# Patient Record
Sex: MALE | Race: OTHER | NOT HISPANIC OR LATINO | URBAN - METROPOLITAN AREA
[De-identification: names, ages, dates, MRNs, and addresses within clinical notes are randomized per-mention and may not be internally consistent; named-entity substitution may affect disease eponyms.]

---

## 2017-08-17 ENCOUNTER — EMERGENCY (EMERGENCY)
Facility: HOSPITAL | Age: 32
LOS: 0 days | Discharge: HOME | End: 2017-08-17

## 2017-08-17 DIAGNOSIS — R10.13 EPIGASTRIC PAIN: ICD-10-CM

## 2017-08-17 DIAGNOSIS — K80.20 CALCULUS OF GALLBLADDER WITHOUT CHOLECYSTITIS WITHOUT OBSTRUCTION: ICD-10-CM

## 2017-08-17 DIAGNOSIS — K29.70 GASTRITIS, UNSPECIFIED, WITHOUT BLEEDING: ICD-10-CM

## 2018-10-20 ENCOUNTER — INPATIENT (INPATIENT)
Facility: HOSPITAL | Age: 33
LOS: 8 days | Discharge: HOME | End: 2018-10-29
Attending: THORACIC SURGERY (CARDIOTHORACIC VASCULAR SURGERY) | Admitting: THORACIC SURGERY (CARDIOTHORACIC VASCULAR SURGERY)

## 2018-10-20 VITALS — WEIGHT: 149.91 LBS

## 2018-10-20 DIAGNOSIS — Z90.49 ACQUIRED ABSENCE OF OTHER SPECIFIED PARTS OF DIGESTIVE TRACT: Chronic | ICD-10-CM

## 2018-10-20 LAB
ALBUMIN SERPL ELPH-MCNC: 3.9 G/DL — SIGNIFICANT CHANGE UP (ref 3.5–5.2)
ALP SERPL-CCNC: 250 U/L — HIGH (ref 30–115)
ALT FLD-CCNC: 169 U/L — HIGH (ref 0–41)
ANION GAP SERPL CALC-SCNC: 14 MMOL/L — SIGNIFICANT CHANGE UP (ref 7–14)
AST SERPL-CCNC: 73 U/L — HIGH (ref 0–41)
BASE EXCESS BLDV CALC-SCNC: 4.6 MMOL/L — HIGH (ref -2–2)
BASOPHILS # BLD AUTO: 0.02 K/UL — SIGNIFICANT CHANGE UP (ref 0–0.2)
BASOPHILS NFR BLD AUTO: 0.2 % — SIGNIFICANT CHANGE UP (ref 0–1)
BILIRUB SERPL-MCNC: 0.6 MG/DL — SIGNIFICANT CHANGE UP (ref 0.2–1.2)
BUN SERPL-MCNC: 12 MG/DL — SIGNIFICANT CHANGE UP (ref 10–20)
CA-I SERPL-SCNC: 1.13 MMOL/L — SIGNIFICANT CHANGE UP (ref 1.12–1.3)
CALCIUM SERPL-MCNC: 9.3 MG/DL — SIGNIFICANT CHANGE UP (ref 8.5–10.1)
CHLORIDE SERPL-SCNC: 98 MMOL/L — SIGNIFICANT CHANGE UP (ref 98–110)
CO2 SERPL-SCNC: 29 MMOL/L — SIGNIFICANT CHANGE UP (ref 17–32)
CREAT SERPL-MCNC: 1 MG/DL — SIGNIFICANT CHANGE UP (ref 0.7–1.5)
D DIMER BLD IA.RAPID-MCNC: 884 NG/ML DDU — HIGH (ref 0–230)
EOSINOPHIL # BLD AUTO: 0.19 K/UL — SIGNIFICANT CHANGE UP (ref 0–0.7)
EOSINOPHIL NFR BLD AUTO: 2.1 % — SIGNIFICANT CHANGE UP (ref 0–8)
GAS PNL BLDV: 141 MMOL/L — SIGNIFICANT CHANGE UP (ref 136–145)
GAS PNL BLDV: SIGNIFICANT CHANGE UP
GLUCOSE SERPL-MCNC: 118 MG/DL — HIGH (ref 70–99)
HCO3 BLDV-SCNC: 30 MMOL/L — HIGH (ref 22–29)
HCT VFR BLD CALC: 38.8 % — LOW (ref 42–52)
HCT VFR BLDA CALC: 36 % — SIGNIFICANT CHANGE UP (ref 34–44)
HGB BLD CALC-MCNC: 11.8 G/DL — LOW (ref 14–18)
HGB BLD-MCNC: 12.7 G/DL — LOW (ref 14–18)
HOROWITZ INDEX BLDV+IHG-RTO: 21 — SIGNIFICANT CHANGE UP
IMM GRANULOCYTES NFR BLD AUTO: 0.7 % — HIGH (ref 0.1–0.3)
LACTATE BLDV-MCNC: 1 MMOL/L — SIGNIFICANT CHANGE UP (ref 0.5–1.6)
LYMPHOCYTES # BLD AUTO: 1.65 K/UL — SIGNIFICANT CHANGE UP (ref 1.2–3.4)
LYMPHOCYTES # BLD AUTO: 18.6 % — LOW (ref 20.5–51.1)
MCHC RBC-ENTMCNC: 26.7 PG — LOW (ref 27–31)
MCHC RBC-ENTMCNC: 32.7 G/DL — SIGNIFICANT CHANGE UP (ref 32–37)
MCV RBC AUTO: 81.5 FL — SIGNIFICANT CHANGE UP (ref 80–94)
MONOCYTES # BLD AUTO: 1.1 K/UL — HIGH (ref 0.1–0.6)
MONOCYTES NFR BLD AUTO: 12.4 % — HIGH (ref 1.7–9.3)
NEUTROPHILS # BLD AUTO: 5.86 K/UL — SIGNIFICANT CHANGE UP (ref 1.4–6.5)
NEUTROPHILS NFR BLD AUTO: 66 % — SIGNIFICANT CHANGE UP (ref 42.2–75.2)
NRBC # BLD: 0 /100 WBCS — SIGNIFICANT CHANGE UP (ref 0–0)
PCO2 BLDV: 45 MMHG — SIGNIFICANT CHANGE UP (ref 41–51)
PH BLDV: 7.43 — SIGNIFICANT CHANGE UP (ref 7.26–7.43)
PLATELET # BLD AUTO: 278 K/UL — SIGNIFICANT CHANGE UP (ref 130–400)
PO2 BLDV: 15 MMHG — LOW (ref 20–40)
POTASSIUM BLDV-SCNC: 3.6 MMOL/L — SIGNIFICANT CHANGE UP (ref 3.3–5.6)
POTASSIUM SERPL-MCNC: 4.1 MMOL/L — SIGNIFICANT CHANGE UP (ref 3.5–5)
POTASSIUM SERPL-SCNC: 4.1 MMOL/L — SIGNIFICANT CHANGE UP (ref 3.5–5)
PROT SERPL-MCNC: 7.5 G/DL — SIGNIFICANT CHANGE UP (ref 6–8)
RBC # BLD: 4.76 M/UL — SIGNIFICANT CHANGE UP (ref 4.7–6.1)
RBC # FLD: 12.6 % — SIGNIFICANT CHANGE UP (ref 11.5–14.5)
SAO2 % BLDV: 21 % — SIGNIFICANT CHANGE UP
SODIUM SERPL-SCNC: 141 MMOL/L — SIGNIFICANT CHANGE UP (ref 135–146)
WBC # BLD: 8.88 K/UL — SIGNIFICANT CHANGE UP (ref 4.8–10.8)
WBC # FLD AUTO: 8.88 K/UL — SIGNIFICANT CHANGE UP (ref 4.8–10.8)

## 2018-10-20 RX ORDER — SODIUM CHLORIDE 9 MG/ML
2000 INJECTION INTRAMUSCULAR; INTRAVENOUS; SUBCUTANEOUS ONCE
Qty: 0 | Refills: 0 | Status: COMPLETED | OUTPATIENT
Start: 2018-10-20 | End: 2018-10-20

## 2018-10-20 RX ORDER — ACETAMINOPHEN 500 MG
975 TABLET ORAL ONCE
Qty: 0 | Refills: 0 | Status: COMPLETED | OUTPATIENT
Start: 2018-10-20 | End: 2018-10-20

## 2018-10-20 RX ORDER — HEPARIN SODIUM 5000 [USP'U]/ML
5000 INJECTION INTRAVENOUS; SUBCUTANEOUS EVERY 8 HOURS
Qty: 0 | Refills: 0 | Status: DISCONTINUED | OUTPATIENT
Start: 2018-10-20 | End: 2018-10-29

## 2018-10-20 RX ORDER — AZITHROMYCIN 500 MG/1
500 TABLET, FILM COATED ORAL ONCE
Qty: 0 | Refills: 0 | Status: COMPLETED | OUTPATIENT
Start: 2018-10-20 | End: 2018-10-20

## 2018-10-20 RX ORDER — AZITHROMYCIN 500 MG/1
500 TABLET, FILM COATED ORAL EVERY 24 HOURS
Qty: 0 | Refills: 0 | Status: DISCONTINUED | OUTPATIENT
Start: 2018-10-20 | End: 2018-10-21

## 2018-10-20 RX ORDER — SODIUM CHLORIDE 9 MG/ML
1000 INJECTION INTRAMUSCULAR; INTRAVENOUS; SUBCUTANEOUS ONCE
Qty: 0 | Refills: 0 | Status: DISCONTINUED | OUTPATIENT
Start: 2018-10-20 | End: 2018-10-25

## 2018-10-20 RX ORDER — CEFTRIAXONE 500 MG/1
2 INJECTION, POWDER, FOR SOLUTION INTRAMUSCULAR; INTRAVENOUS EVERY 24 HOURS
Qty: 0 | Refills: 0 | Status: DISCONTINUED | OUTPATIENT
Start: 2018-10-20 | End: 2018-10-23

## 2018-10-20 RX ORDER — CEFTRIAXONE 500 MG/1
1 INJECTION, POWDER, FOR SOLUTION INTRAMUSCULAR; INTRAVENOUS ONCE
Qty: 0 | Refills: 0 | Status: COMPLETED | OUTPATIENT
Start: 2018-10-20 | End: 2018-10-20

## 2018-10-20 RX ORDER — ACETAMINOPHEN 500 MG
650 TABLET ORAL EVERY 6 HOURS
Qty: 0 | Refills: 0 | Status: DISCONTINUED | OUTPATIENT
Start: 2018-10-20 | End: 2018-10-29

## 2018-10-20 RX ORDER — IBUPROFEN 200 MG
600 TABLET ORAL EVERY 6 HOURS
Qty: 0 | Refills: 0 | Status: DISCONTINUED | OUTPATIENT
Start: 2018-10-20 | End: 2018-10-29

## 2018-10-20 RX ADMIN — Medication 200 MILLIGRAM(S): at 22:18

## 2018-10-20 RX ADMIN — Medication 650 MILLIGRAM(S): at 21:50

## 2018-10-20 RX ADMIN — Medication 650 MILLIGRAM(S): at 23:13

## 2018-10-20 RX ADMIN — SODIUM CHLORIDE 2000 MILLILITER(S): 9 INJECTION INTRAMUSCULAR; INTRAVENOUS; SUBCUTANEOUS at 12:30

## 2018-10-20 RX ADMIN — Medication 975 MILLIGRAM(S): at 13:16

## 2018-10-20 RX ADMIN — CEFTRIAXONE 100 GRAM(S): 500 INJECTION, POWDER, FOR SOLUTION INTRAMUSCULAR; INTRAVENOUS at 12:57

## 2018-10-20 RX ADMIN — AZITHROMYCIN 255 MILLIGRAM(S): 500 TABLET, FILM COATED ORAL at 13:47

## 2018-10-20 RX ADMIN — Medication 975 MILLIGRAM(S): at 12:56

## 2018-10-20 NOTE — H&P ADULT - DOES THIS PATIENT HAVE A HISTORY OF OR HAS BEEN DX WITH HEART FAILURE?
----- Message from Issa Valencia sent at 2/14/2017  8:04 AM CST -----  Contact: Gamaliel Lugo  Requesting dates for meningitis, measles, and tetanus. Please call [phone]. Thanks!   no

## 2018-10-20 NOTE — H&P ADULT - NSHPREVIEWOFSYSTEMS_GEN_ALL_CORE
CONSTITUTIONAL: No weakness, +subjective fever, no chills  EYES/ENT: No visual changes;  No vertigo or throat pain   NECK: No pain or stiffness  RESPIRATORY: dry cough, no wheezing or hemoptysis;   CARDIOVASCULAR: no palpitations  GASTROINTESTINAL: No abdominal or epigastric pain. No nausea, vomiting, or hematemesis; No diarrhea or constipation. No melena or hematochezia.  GENITOURINARY: No dysuria, frequency or hematuria  NEUROLOGICAL: No numbness or weakness  SKIN: No itching, rashes

## 2018-10-20 NOTE — H&P ADULT - ASSESSMENT
34 yo M with progressively worsening SOB, chest pain, cough and subjective fever.    # Lung mass with pleural effusion. ?Infectious vs. malignancy  - low risk for malignancy - non smoker, no FHx, no aerosol lung irritants at work  - will treat for PNA given fever, tachycardia with Ceftriaxone and Azithromycin   - IVF  - resp Cx, BCx x2  - Pulmonary eval    #Transaminitis   - will repeat, if still elevated will consider US liver

## 2018-10-20 NOTE — ED PROVIDER NOTE - MEDICAL DECISION MAKING DETAILS
Pt with fever, cough, pleuritic right CP as above, one xam vital signs appreciated, mild tachypnea on minimal exertion but comfortable at rest, conj pink neck supple cor reg lungs with absent BS right base abd snt calves nontender, labs and studies reviewed and d/w patient, will admit for IV abx, pulm eval, stable for floor

## 2018-10-20 NOTE — ED PROVIDER NOTE - PROGRESS NOTE DETAILS
Spoke to radiologist that states will make additional impression to CTA: Radi logist states right lower lobe with lung mass and will advised f/u in 4 months for CT/PET scan. Will advise pt on additional impressions made by radiology. Updated patient on need to admit to hospital for course of IV abx. Pt is accepting of plan. Also discussed CTA results with pt, discussed finding of 5cm mass that will need to be f/u with lung specialist. Pt made aware of all results.Pt has copies of all results. Spoke with Dr. Cardenas and accepts admission. I was directly involved in the care of this patient. PA Fellow Rj note/plan reviewed and agreed.

## 2018-10-20 NOTE — ED PROVIDER NOTE - OBJECTIVE STATEMENT
32 yo male, no significant PMH, presents to the ED for evaluation of right sided chest pain. Patient reports he has had 10 days of right sided chest pain that is radiating to right mid back and non-productive cough; admits to subjective fever since yesterday. Pt reports he has not taken any OTC medication for pain or subjective fever. Patient also reports recent travel from [place] one month ago and states has been SOB with exertion over the last few days. Denies HA, visual changes, ear pain, sore throat, left sided chest pain, N/V/D, numbness, tingling, dizziness, recent illness, or trauma.

## 2018-10-20 NOTE — H&P ADULT - HISTORY OF PRESENT ILLNESS
32 yo M with no significant MHx presented with c/o SOB on exertion, chest pain started 2 weeks ago and gotten progressively worse, 1 week ago cough started and last 4 nights pt had subjective fever. He traveled to [place] a month ago, works as a . Denies sick contacts. He describes pain as sharp, worse with deep breath, do not radiate anywhere, alleviates with Advil.

## 2018-10-20 NOTE — H&P ADULT - NSHPPHYSICALEXAM_GEN_ALL_CORE
NAD, not toxic looking  HEENT: PERRL  NECK: supple, no JVD  RESP: Right side decreased breath sounds, no crackles or wheezes, left side cta  CVS: S1S2, RRR, tachy  GI: abdomen soft NT, ND  Extremities: no c/c/edema, no calf tenderness  NEURO: AOx3, no focal deficit  H/L: no enlarged LN noted

## 2018-10-20 NOTE — ED ADULT NURSE NOTE - NSIMPLEMENTINTERV_GEN_ALL_ED
Implemented All Universal Safety Interventions:  [place] to call system. Call bell, personal items and telephone within reach. Instruct patient to call for assistance. Room bathroom lighting operational. Non-slip footwear when patient is off stretcher. Physically safe environment: no spills, clutter or unnecessary equipment. Stretcher in lowest position, wheels locked, appropriate side rails in place.

## 2018-10-20 NOTE — ED PROVIDER NOTE - NS ED ROS FT
Constitutional: (+) subjective fever, (-) chills, (-) night sweats, (-) recent illness, (+) recent travel,   Eyes: (-) blurry vision, (-) pain,   ENT: (-) ear pain, (-) discharge, (-) nasal congestions, (-) sore throat   Cardiovascular: (+) right sided chest pain, (-) syncope  Respiratory: (+) cough, (+) shortness of breath with exertion, (-) dyspnea,   Gastrointestinal: (-) vomiting, (-) diarrhea, (-)nausea,   Musculoskeletal: (-) neck pain, (-) back pain, (-) joint pain,   Integumentary: (-) rash, (-) edema  Neurological: (-) headache, (-) altered mental status, (-) dizziness, (-) tingling, (-)numbness, (-) tremors  Psychiatric: (-) hallucinations, (-)nervousness, (-)depression, (-)SI/HI  Peripheral Vascular: (-) pain while walking, (-) leg swelling, (-) color changes  : (-)frequency, (-)polyuria, (-)dysuria, (-)nocturia, (-) hematuria  Allergic/Immunologic: (-) pruritus Constitutional: (+) subjective fever, (-) chills, (-) night sweats, (-) recent illness, (+) recent travel,   Eyes: (-) blurry vision, (-) pain,   ENT: (-) ear pain, (-) discharge, (-) nasal congestions, (-) sore throat   Cardiovascular: (+) right sided chest pain, (-) syncope  Respiratory: (+) cough, (+) shortness of breath with exertion, (-) dyspnea,   Gastrointestinal: (-) vomiting, (-) diarrhea, (-)nausea,   Musculoskeletal: (-) neck pain, (-) back pain, (-) joint pain,   Integumentary: (-) rash, (-) edema  Neurological: (-) headache, (-) dizziness, (-) tingling, (-)numbness  :(-)dysuria,

## 2018-10-20 NOTE — ED PROVIDER NOTE - PHYSICAL EXAMINATION
Physical Exam    Vital Signs: I have reviewed the initial vital signs.  Constitutional: well-nourished, appears stated age, no acute distress  Eyes: Conjunctiva pink, Sclera clear, PERRLA, EOMI.  Cardiovascular: S1 and S2, tachycardic, regular rhythm, well-perfused extremities, radial pulses equal and 2+, pedal pulses are 2+ and equal. Cap refill is less than 2 seconds.  Respiratory: unlabored respiratory effort, right lung base with decreased air entry and scattered rhonchi, left side of lung is clear to ascultation with good air entry without.  Gastrointestinal: soft, non-tender abdomen,  Musculoskeletal: supple neck, no lower extremity edema, no midline tenderness  Integumentary: warm, dry, no rash  Neurologic: awake, alert, cranial nerves II-XII grossly intact, extremities’ motor and sensory functions grossly intact

## 2018-10-20 NOTE — H&P ADULT - NSHPLABSRESULTS_GEN_ALL_CORE
12.7   8.88  )-----------( 278      ( 20 Oct 2018 12:22 )             38.8   10-20    141  |  98  |  12  ----------------------------<  118<H>  4.1   |  29  |  1.0    Ca    9.3      20 Oct 2018 12:22    TPro  7.5  /  Alb  3.9  /  TBili  0.6  /  DBili  x   /  AST  73<H>  /  ALT  169<H>  /  AlkPhos  250<H>  10-20    Blood Gas Venous - Lactate: 1.0 mmoL/L (10.20.18 @ 14:24)    D-Dimer Assay, Quantitative: 884 ng/mL DDU (10.20.18 @ 12:22)    CTA chest: 1. Small right pleural effusion with right lower lobe atelectasis and   suggestion of posterior right lower lobe 5 cm mass. Further evaluation   with PET or short interval 4 week CT chest follow-up recommended, as   malignancy is a high consideration.    2. No evidence of acute pulmonary emboli.

## 2018-10-21 LAB
ANION GAP SERPL CALC-SCNC: 17 MMOL/L — HIGH (ref 7–14)
BUN SERPL-MCNC: 9 MG/DL — LOW (ref 10–20)
CALCIUM SERPL-MCNC: 8.6 MG/DL — SIGNIFICANT CHANGE UP (ref 8.5–10.1)
CHLORIDE SERPL-SCNC: 97 MMOL/L — LOW (ref 98–110)
CO2 SERPL-SCNC: 26 MMOL/L — SIGNIFICANT CHANGE UP (ref 17–32)
CREAT SERPL-MCNC: 0.8 MG/DL — SIGNIFICANT CHANGE UP (ref 0.7–1.5)
GLUCOSE SERPL-MCNC: 113 MG/DL — HIGH (ref 70–99)
HCT VFR BLD CALC: 37.2 % — LOW (ref 42–52)
HGB BLD-MCNC: 12.3 G/DL — LOW (ref 14–18)
MCHC RBC-ENTMCNC: 26.9 PG — LOW (ref 27–31)
MCHC RBC-ENTMCNC: 33.1 G/DL — SIGNIFICANT CHANGE UP (ref 32–37)
MCV RBC AUTO: 81.2 FL — SIGNIFICANT CHANGE UP (ref 80–94)
NRBC # BLD: 0 /100 WBCS — SIGNIFICANT CHANGE UP (ref 0–0)
PLATELET # BLD AUTO: 294 K/UL — SIGNIFICANT CHANGE UP (ref 130–400)
POTASSIUM SERPL-MCNC: 4.1 MMOL/L — SIGNIFICANT CHANGE UP (ref 3.5–5)
POTASSIUM SERPL-SCNC: 4.1 MMOL/L — SIGNIFICANT CHANGE UP (ref 3.5–5)
RBC # BLD: 4.58 M/UL — LOW (ref 4.7–6.1)
RBC # FLD: 12.7 % — SIGNIFICANT CHANGE UP (ref 11.5–14.5)
SODIUM SERPL-SCNC: 140 MMOL/L — SIGNIFICANT CHANGE UP (ref 135–146)
WBC # BLD: 10.8 K/UL — SIGNIFICANT CHANGE UP (ref 4.8–10.8)
WBC # FLD AUTO: 10.8 K/UL — SIGNIFICANT CHANGE UP (ref 4.8–10.8)

## 2018-10-21 RX ORDER — ALBUTEROL 90 UG/1
1.25 AEROSOL, METERED ORAL EVERY 4 HOURS
Qty: 0 | Refills: 0 | Status: DISCONTINUED | OUTPATIENT
Start: 2018-10-21 | End: 2018-10-29

## 2018-10-21 RX ORDER — SODIUM CHLORIDE 9 MG/ML
1000 INJECTION INTRAMUSCULAR; INTRAVENOUS; SUBCUTANEOUS ONCE
Qty: 0 | Refills: 0 | Status: DISCONTINUED | OUTPATIENT
Start: 2018-10-21 | End: 2018-10-25

## 2018-10-21 RX ORDER — SODIUM CHLORIDE 9 MG/ML
1000 INJECTION INTRAMUSCULAR; INTRAVENOUS; SUBCUTANEOUS
Qty: 0 | Refills: 0 | Status: DISCONTINUED | OUTPATIENT
Start: 2018-10-21 | End: 2018-10-25

## 2018-10-21 RX ADMIN — Medication 100 MILLIGRAM(S): at 21:35

## 2018-10-21 RX ADMIN — Medication 600 MILLIGRAM(S): at 22:56

## 2018-10-21 RX ADMIN — Medication 600 MILLIGRAM(S): at 18:12

## 2018-10-21 RX ADMIN — Medication 200 MILLIGRAM(S): at 09:18

## 2018-10-21 RX ADMIN — Medication 600 MILLIGRAM(S): at 23:18

## 2018-10-21 RX ADMIN — Medication 200 MILLIGRAM(S): at 18:07

## 2018-10-21 RX ADMIN — CEFTRIAXONE 100 GRAM(S): 500 INJECTION, POWDER, FOR SOLUTION INTRAMUSCULAR; INTRAVENOUS at 10:05

## 2018-10-21 RX ADMIN — Medication 650 MILLIGRAM(S): at 05:40

## 2018-10-21 RX ADMIN — Medication 600 MILLIGRAM(S): at 09:18

## 2018-10-21 RX ADMIN — Medication 100 MILLIGRAM(S): at 18:07

## 2018-10-21 RX ADMIN — Medication 650 MILLIGRAM(S): at 05:10

## 2018-10-21 RX ADMIN — ALBUTEROL 1.25 MILLIGRAM(S): 90 AEROSOL, METERED ORAL at 19:55

## 2018-10-21 NOTE — PROGRESS NOTE ADULT - SUBJECTIVE AND OBJECTIVE BOX
KOURTNEY VEE    Patient is a 33y old  Male who presents with a chief complaint of SOB on exertion, chest pain and cough. (21 Oct 2018 10:00)    HPI:  32 yo M with no significant MHx presented with c/o SOB on exertion, chest pain started 2 weeks ago and gotten progressively worse, 1 week ago cough started and last 4 nights pt had subjective fever. He traveled to [place] a month ago, works as a . Denies sick contacts. He describes pain as sharp, worse with deep breath, do not radiate anywhere, alleviates with Advil. (20 Oct 2018 17:58)    INTERVAL HPI/OVERNIGHT EVENTS: no events. intermittent fever    ROS: All ROS negative except cough, weakness and chest pain    PHYSICAL EXAM:  T(C): 38, Max: 38.8 (10-20-18 @ 22:08)  HR: 112 (101 - 132)  BP: 130/83 (126/87 - 146/75)  RR: 16 (16 - 18)  SpO2: 97% (96% - 98%)    NAD, not toxic looking HEENT: PERRL NECK: supple, no JVD RESP: Right side decreased breath sounds, no crackles or wheezes, left side cta CVS: S1S2, RRR, tachy GI: abdomen soft NT, ND Extremities: no c/c/edema, no calf tenderness NEURO: AOx3, no focal deficit H/L: no enlarged LN noted	      Care Discussed with Consultants/Other Providers     LABS:                        12.3   10.80 )-----------( 294      ( 21 Oct 2018 07:11 )             37.2     10-21    140  |  97<L>  |  9<L>  ----------------------------<  113<H>  4.1   |  26  |  0.8    Ca    8.6      21 Oct 2018 07:11    TPro  7.5  /  Alb  3.9  /  TBili  0.6  /  DBili  x   /  AST  73<H>  /  ALT  169<H>  /  AlkPhos  250<H>  10-20    MEDICATIONS  (STANDING):  benzonatate 100 milliGRAM(s) Oral every 8 hours  cefTRIAXone   IVPB 2 Gram(s) IV Intermittent every 24 hours  heparin  Injectable 5000 Unit(s) SubCutaneous every 8 hours  levoFLOXacin IVPB      sodium chloride 0.9% Bolus 1000 milliLiter(s) IV Bolus once  sodium chloride 0.9% Bolus 1000 milliLiter(s) IV Bolus once  sodium chloride 0.9%. 1000 milliLiter(s) (125 mL/Hr) IV Continuous <Continuous>    MEDICATIONS  (PRN):  acetaminophen   Tablet .. 650 milliGRAM(s) Oral every 6 hours PRN Temp greater or equal to 38C (100.4F)  ALBUTerol   0.042% 1.25 milliGRAM(s) Nebulizer every 4 hours PRN Shortness of Breath  guaiFENesin    Syrup 200 milliGRAM(s) Oral every 6 hours PRN Cough  ibuprofen  Tablet. 600 milliGRAM(s) Oral every 6 hours PRN Mild Pain (1 - 3)

## 2018-10-21 NOTE — PROGRESS NOTE ADULT - ASSESSMENT
34 yo M with progressively worsening SOB, chest pain, cough and subjective fever.    # Sepsis presented on admission due to CAP.   - discussed with Pulmonary - less likley malignancy, most likely PNA with parapneumonic effusion  - continue Ceftriaxone 2 gr Q24 IV, will switch to Levaquin 750 IV  - continue IVF  - resp Cx, BCx x2 pending  - ID eval  - Legionella, RVP, Mycoplasma AB pending  - possible diagnostic tap as per pulmonary    #Transaminitis   - will repeat, if still elevated will consider US liver    DVT ppx  Full code

## 2018-10-21 NOTE — PROGRESS NOTE ADULT - SUBJECTIVE AND OBJECTIVE BOX
Patient is a 33y old  Male who presents with a chief complaint of SOB on exertion, chest pain and cough. (20 Oct 2018 17:58)      HPI:  32 yo M with no significant MHx presented with c/o SOB on exertion, chest pain started 2 weeks ago and gotten progressively worse, 1 week ago cough started and last 4 nights pt had subjective fever. He traveled to [place] a month ago, works as a . Denies sick contacts. He describes pain as sharp, worse with deep breath, do not radiate anywhere, alleviates with Advil. (20 Oct 2018 17:58)  ct scan done in ER ?? 5cm mass i reviewed the images not able to see the mass it look as pneumonia with parapneumonic effusion which fit with the history       PAST MEDICAL & SURGICAL HISTORY:  No pertinent past medical history  S/P cholecystectomy      FAMILY HISTORY:  No pertinent family history in first degree relatives    Family history: No family cardiovascular system   Occupation:  Alochol: Denied  Smoking: [place]   Drug Use: Denied  Marital Status:           Allergies    No Known Allergies    Intolerances        Home Medications:      ROS: as in HPI; All other systems reviewed are negative        PHYSICAL EXAM:  Vital Signs Last 24 Hrs  T(C): 38 (21 Oct 2018 09:19), Max: 38.8 (20 Oct 2018 22:08)  T(F): 100.4 (21 Oct 2018 09:19), Max: 101.9 (20 Oct 2018 22:08)  HR: 112 (21 Oct 2018 09:19) (101 - 132)  BP: 130/83 (21 Oct 2018 05:09) (126/85 - 146/75)  BP(mean): --  RR: 16 (21 Oct 2018 05:09) (16 - 22)  SpO2: 97% (20 Oct 2018 16:16) (96% - 98%)      GENERAL: NAD, well-groomed, well-developed  HEAD:  Atraumatic, Normocephalic  EYES: EOMI, PERRLA, conjunctiva and sclera clear  ENMT: No tonsillar erythema, exudates, or enlargement; Moist mucous membranes, Good dentition, No lesions  NECK: Supple, No JVD, Normal thyroid  NERVOUS SYSTEM:  Alert & Oriented X3, Good concentration; Motor Strength 5/5 B/L upper and lower extremities; DTRs 2+ intact and symmetric  CHEST/LUNG: Clear to percussion bilaterally; No rales, rhonchi, wheezing, or rubs  HEART: Regular rate and rhythm; No murmurs, rubs, or gallops  ABDOMEN: Soft, Nontender, Nondistended; Bowel sounds present  EXTREMITIES:  2+ Peripheral Pulses, No clubbing, cyanosis, or edema  LYMPH: No lymphadenopathy noted  SKIN: No rashes or lesions    HOSPITAL MEDICATIONS:  MEDICATIONS  (STANDING):  azithromycin  IVPB 500 milliGRAM(s) IV Intermittent every 24 hours  benzonatate 100 milliGRAM(s) Oral every 8 hours  cefTRIAXone   IVPB 2 Gram(s) IV Intermittent every 24 hours  heparin  Injectable 5000 Unit(s) SubCutaneous every 8 hours  sodium chloride 0.9% Bolus 1000 milliLiter(s) IV Bolus once    MEDICATIONS  (PRN):  acetaminophen   Tablet .. 650 milliGRAM(s) Oral every 6 hours PRN Temp greater or equal to 38C (100.4F)  ALBUTerol   0.042% 1.25 milliGRAM(s) Nebulizer every 4 hours PRN Shortness of Breath  guaiFENesin    Syrup 200 milliGRAM(s) Oral every 6 hours PRN Cough  ibuprofen  Tablet. 600 milliGRAM(s) Oral every 6 hours PRN Mild Pain (1 - 3)      LABS:                        12.3   10.80 )-----------( 294      ( 21 Oct 2018 07:11 )             37.2     10-21    140  |  97<L>  |  9<L>  ----------------------------<  113<H>  4.1   |  26  |  0.8    Ca    8.6      21 Oct 2018 07:11    TPro  7.5  /  Alb  3.9  /  TBili  0.6  /  DBili  x   /  AST  73<H>  /  ALT  169<H>  /  AlkPhos  250<H>  10-20          Venous Blood Gas:  10-20 @ 14:24  7.43/45/15/30/21  VBG Lactate: 1.0          RADIOLOGY:  [ ] Reviewed and interpreted by me  < from: CT Angio Chest w/ IV Cont (10.20.18 @ 15:26) >  1. Small right pleural effusion with right lower lobe atelectasis and   suggestion of posterior right lower lobe 5 cm mass. Further evaluation   with PET or short interval 4 week CT chest follow-up recommended, as   malignancy is a high consideration.    2. No evidence of acute pulmonary emboli.    Spoke with Charlie ALEMAN on 10/20/2018 3:45 PM with readback.    < end of copied text >    ECHO:    Point of Care Ultrasound Findings;    PFT:

## 2018-10-21 NOTE — PROGRESS NOTE ADULT - ASSESSMENT
Impression:  most likely PNA with parapneumonic effusion   doubt malignancy         Plan:  pan cx   virla panel now   ID consult   on Rocephine IV 2 gram   d/c azithromycin   Iv Levaquin 750 mg start now   follow cx Bld cx   cxr abhinav   will revaluate abhinav will check with US if can do thoracentesis diagnostic ?? small effusion   case discussed with patient and Hospitalist

## 2018-10-22 LAB
ALBUMIN SERPL ELPH-MCNC: 3.3 G/DL — LOW (ref 3.5–5.2)
ALP SERPL-CCNC: 225 U/L — HIGH (ref 30–115)
ALT FLD-CCNC: 126 U/L — HIGH (ref 0–41)
ANION GAP SERPL CALC-SCNC: 18 MMOL/L — HIGH (ref 7–14)
AST SERPL-CCNC: 39 U/L — SIGNIFICANT CHANGE UP (ref 0–41)
BILIRUB SERPL-MCNC: 0.3 MG/DL — SIGNIFICANT CHANGE UP (ref 0.2–1.2)
BUN SERPL-MCNC: 10 MG/DL — SIGNIFICANT CHANGE UP (ref 10–20)
CALCIUM SERPL-MCNC: 8.6 MG/DL — SIGNIFICANT CHANGE UP (ref 8.5–10.1)
CHLORIDE SERPL-SCNC: 99 MMOL/L — SIGNIFICANT CHANGE UP (ref 98–110)
CO2 SERPL-SCNC: 24 MMOL/L — SIGNIFICANT CHANGE UP (ref 17–32)
CREAT SERPL-MCNC: 0.8 MG/DL — SIGNIFICANT CHANGE UP (ref 0.7–1.5)
GLUCOSE SERPL-MCNC: 98 MG/DL — SIGNIFICANT CHANGE UP (ref 70–99)
HCT VFR BLD CALC: 35.1 % — LOW (ref 42–52)
HGB BLD-MCNC: 11.6 G/DL — LOW (ref 14–18)
LEGIONELLA AG UR QL: NEGATIVE — SIGNIFICANT CHANGE UP
MCHC RBC-ENTMCNC: 26.7 PG — LOW (ref 27–31)
MCHC RBC-ENTMCNC: 33 G/DL — SIGNIFICANT CHANGE UP (ref 32–37)
MCV RBC AUTO: 80.7 FL — SIGNIFICANT CHANGE UP (ref 80–94)
NRBC # BLD: 0 /100 WBCS — SIGNIFICANT CHANGE UP (ref 0–0)
PLATELET # BLD AUTO: 274 K/UL — SIGNIFICANT CHANGE UP (ref 130–400)
POTASSIUM SERPL-MCNC: 4.3 MMOL/L — SIGNIFICANT CHANGE UP (ref 3.5–5)
POTASSIUM SERPL-SCNC: 4.3 MMOL/L — SIGNIFICANT CHANGE UP (ref 3.5–5)
PROT SERPL-MCNC: 6.5 G/DL — SIGNIFICANT CHANGE UP (ref 6–8)
RAPID RVP RESULT: SIGNIFICANT CHANGE UP
RBC # BLD: 4.35 M/UL — LOW (ref 4.7–6.1)
RBC # FLD: 12.8 % — SIGNIFICANT CHANGE UP (ref 11.5–14.5)
SODIUM SERPL-SCNC: 141 MMOL/L — SIGNIFICANT CHANGE UP (ref 135–146)
WBC # BLD: 9.59 K/UL — SIGNIFICANT CHANGE UP (ref 4.8–10.8)
WBC # FLD AUTO: 9.59 K/UL — SIGNIFICANT CHANGE UP (ref 4.8–10.8)

## 2018-10-22 RX ADMIN — SODIUM CHLORIDE 125 MILLILITER(S): 9 INJECTION INTRAMUSCULAR; INTRAVENOUS; SUBCUTANEOUS at 11:10

## 2018-10-22 RX ADMIN — Medication 600 MILLIGRAM(S): at 14:07

## 2018-10-22 RX ADMIN — Medication 100 MILLIGRAM(S): at 22:22

## 2018-10-22 RX ADMIN — CEFTRIAXONE 100 GRAM(S): 500 INJECTION, POWDER, FOR SOLUTION INTRAMUSCULAR; INTRAVENOUS at 11:08

## 2018-10-22 NOTE — CONSULT NOTE ADULT - ASSESSMENT
IMPRESSION  Pt with pneumonia, pleural effusion and atelectasis    On cefTRIAXone   IVPB 2 Gram(s) IV Intermittent every 24 hours  levoFLOXacin IVPB 750 milliGRAM(s) IV Intermittent every 24     RVP negative, urine legionella antigen negative,       SUGGESTIONs  Await blood cultures, urine culture  Continue Rocephin & Zithromax

## 2018-10-22 NOTE — CONSULT NOTE ADULT - SUBJECTIVE AND OBJECTIVE BOX
KOURTNEY VEE 33yMalePatient is a 33y old  Male who presents with a chief complaint of SOB on exertion, chest pain and cough. (22 Oct 2018 10:13)      Patient has history of:  No Known Allergies        PNEUMONIA  ^CHEST PAIN  No pertinent family history in first degree relatives  Handoff  MEWS Score  No pertinent past medical history  Pneumonia  S/P cholecystectomy  CHEST PAIN  90+        Patient treated with:  cefTRIAXone   IVPB 2 Gram(s) IV Intermittent every 24 hours  levoFLOXacin IVPB 750 milliGRAM(s) IV Intermittent every 24 hours  levoFLOXacin IVPB            PHYSICAL EXAM  T(F): 99.8 (10-22-18 @ 05:00), Max: 99.8 (10-21-18 @ 22:50)  HR: 111 (10-22-18 @ 05:00) (100 - 111)  BP: 131/80 (10-22-18 @ 05:00) (126/78 - 131/80)  RR: 16 (10-22-18 @ 05:00) (16 - 16)  SpO2: --  Daily     Daily   HEENT: normal, no nuchal rigidity  Cor: RSR Nl S1 S2  Lungs: clear  Decreased breath sounds at bases    Abdomen: Nontender, Nl BS,     Ext: No clubbing,cyanosis or edema    LAB & RADIOLOGIC RESULTS:                        12.3   10.80 )-----------( 294      ( 21 Oct 2018 07:11 )             37.2         10-22    x   |  x   |  10  ----------------------------<  98  x    |  24  |  x       TPro  6.5  /  Alb  x   /  TBili  0.3  /  DBili  x   /  AST  39  /  ALT  x   /  AlkPhos  x   10-22    <--<9>>1.0              Culture - Blood (collected 20 Oct 2018 13:15)  Source: .Blood Blood  Preliminary Report (21 Oct 2018 18:01):    No growth to date.    Culture - Blood (collected 20 Oct 2018 13:15)  Source: .Blood Blood  Preliminary Report (21 Oct 2018 18:01):    No growth to date.

## 2018-10-22 NOTE — PROGRESS NOTE ADULT - ASSESSMENT
Impression:  most likely PNA with parapneumonic effusion   doubt malignancy         Plan:  follow ortiz cx   ID follow   on Rocephine IV 2 gram   Iv Levaquin 750 mg  follow cx Bld cx   cxr today   follow LFT   do RUQ sono ?? hepatomegaly   case discussed with patient and Hospitalist Impression:  most likely PNA with parapneumonic effusion   doubt malignancy         Plan: case discussed with radiology and ct reviewed he has multiple loculates effusion and the mass that was mentioned on ct scan can be medial loculate effusion   will continue ABX repeat ct scan in couple of day , if any worsening or fever or increase wbc then will pig tail drainage by IR or ct surgery VATS   now clinically improved and no fever   follow ortiz cx   ID follow   on Rocephine IV 2 gram   Iv Levaquin 750 mg  follow cx Bld cx   cxr today   follow LFT   do RUQ sono ?? hepatomegaly   case discussed with patient and Hospitalist

## 2018-10-22 NOTE — PROGRESS NOTE ADULT - ASSESSMENT
34 yo M with progressively worsening SOB, chest pain, cough and subjective fever.    # Sepsis presented on admission due to CAP.   - discussed with Pulmonary - less likely malignancy, most likely PNA with parapneumonic effusion  - continue Ceftriaxone 2 gr Q24 IV, Levaquin 750 IV  - continue IVF  - resp Cx not collected, BCx x2 prelim NGTD  - Legionella, RVP, Mycoplasma negative  - as per pulm multiple loculated effusions, will repeat CT in 24-48 hrs  - ID on board    #Transaminitis   - still elevated, US liver    DVT ppx  Full code

## 2018-10-22 NOTE — PROGRESS NOTE ADULT - SUBJECTIVE AND OBJECTIVE BOX
Patient is a 33y old  Male who presents with a chief complaint of SOB on exertion, chest pain and cough. (21 Oct 2018 14:36)      INTERVAL HISTORY/overnight events over feel more then 50 % better no fever over night   today i did beside US very samll effusion can not be tapped and consolidation   also enlarge liver         Vital Signs Last 24 Hrs  T(C): 37.7 (22 Oct 2018 05:00), Max: 37.7 (21 Oct 2018 22:50)  T(F): 99.8 (22 Oct 2018 05:00), Max: 99.8 (21 Oct 2018 22:50)  HR: 111 (22 Oct 2018 05:00) (100 - 111)  BP: 131/80 (22 Oct 2018 05:00) (126/78 - 131/80)  BP(mean): --  RR: 16 (22 Oct 2018 05:00) (16 - 16)  SpO2: --  Daily     Daily   I&O's Summary    21 Oct 2018 07:01  -  22 Oct 2018 07:00  --------------------------------------------------------  IN: 800 mL / OUT: 0 mL / NET: 800 mL        Physical Examination:    General: No acute distress.  Alert, oriented, interactive, nonfocal    HEENT: Pupils equal, reactive to light.  Symmetric.    PULM: decrease RLL and crackles     CVS: Regular rate and rhythm, no murmurs, rubs, or gallops    ABD: Soft, nondistended, nontender, normoactive bowel sounds, no masses    EXT: No edema, nontender    SKIN: Warm and well perfused, no rashes noted.    Neurology: no focal deficit     Musculoskeletal : Move all extremity         Lab Results:                        12.3   10.80 )-----------( 294      ( 21 Oct 2018 07:11 )             37.2     22 Oct 2018 08:19    x      |  x      |  10     ----------------------------<  98     x       |  24     |  x        Ca    8.6        22 Oct 2018 08:19    TPro  6.5    /  Alb  x      /  TBili  0.3    /  DBili  x      /  AST  39     /  ALT  x      /  AlkPhos  x      22 Oct 2018 08:19  Amylase x     lipase x                  Microbiology    Culture - Blood (collected 20 Oct 2018 13:15)  Source: .Blood Blood  Preliminary Report (21 Oct 2018 18:01):    No growth to date.    Culture - Blood (collected 20 Oct 2018 13:15)  Source: .Blood Blood  Preliminary Report (21 Oct 2018 18:01):    No growth to date.        Medication:  MEDICATIONS  (STANDING):  benzonatate 100 milliGRAM(s) Oral every 8 hours  cefTRIAXone   IVPB 2 Gram(s) IV Intermittent every 24 hours  heparin  Injectable 5000 Unit(s) SubCutaneous every 8 hours  levoFLOXacin IVPB 750 milliGRAM(s) IV Intermittent every 24 hours  levoFLOXacin IVPB      sodium chloride 0.9% Bolus 1000 milliLiter(s) IV Bolus once  sodium chloride 0.9% Bolus 1000 milliLiter(s) IV Bolus once  sodium chloride 0.9%. 1000 milliLiter(s) (125 mL/Hr) IV Continuous <Continuous>    MEDICATIONS  (PRN):  acetaminophen   Tablet .. 650 milliGRAM(s) Oral every 6 hours PRN Temp greater or equal to 38C (100.4F)  ALBUTerol   0.042% 1.25 milliGRAM(s) Nebulizer every 4 hours PRN Shortness of Breath  guaiFENesin    Syrup 200 milliGRAM(s) Oral every 6 hours PRN Cough  ibuprofen  Tablet. 600 milliGRAM(s) Oral every 6 hours PRN Mild Pain (1 - 3)        IMAGING STUDIES:

## 2018-10-23 ENCOUNTER — RESULT REVIEW (OUTPATIENT)
Age: 33
End: 2018-10-23

## 2018-10-23 DIAGNOSIS — J13 PNEUMONIA DUE TO STREPTOCOCCUS PNEUMONIAE: ICD-10-CM

## 2018-10-23 LAB
ANION GAP SERPL CALC-SCNC: 16 MMOL/L — HIGH (ref 7–14)
APTT BLD: 30.9 SEC — SIGNIFICANT CHANGE UP (ref 27–39.2)
BUN SERPL-MCNC: 9 MG/DL — LOW (ref 10–20)
CALCIUM SERPL-MCNC: 9.1 MG/DL — SIGNIFICANT CHANGE UP (ref 8.5–10.1)
CHLORIDE SERPL-SCNC: 98 MMOL/L — SIGNIFICANT CHANGE UP (ref 98–110)
CO2 SERPL-SCNC: 24 MMOL/L — SIGNIFICANT CHANGE UP (ref 17–32)
CREAT SERPL-MCNC: 0.7 MG/DL — SIGNIFICANT CHANGE UP (ref 0.7–1.5)
GLUCOSE SERPL-MCNC: 103 MG/DL — HIGH (ref 70–99)
HCT VFR BLD CALC: 37.2 % — LOW (ref 42–52)
HGB BLD-MCNC: 12.4 G/DL — LOW (ref 14–18)
INR BLD: 1.48 RATIO — HIGH (ref 0.65–1.3)
MCHC RBC-ENTMCNC: 26.7 PG — LOW (ref 27–31)
MCHC RBC-ENTMCNC: 33.3 G/DL — SIGNIFICANT CHANGE UP (ref 32–37)
MCV RBC AUTO: 80.2 FL — SIGNIFICANT CHANGE UP (ref 80–94)
NRBC # BLD: 0 /100 WBCS — SIGNIFICANT CHANGE UP (ref 0–0)
PLATELET # BLD AUTO: 312 K/UL — SIGNIFICANT CHANGE UP (ref 130–400)
POTASSIUM SERPL-MCNC: 4.2 MMOL/L — SIGNIFICANT CHANGE UP (ref 3.5–5)
POTASSIUM SERPL-SCNC: 4.2 MMOL/L — SIGNIFICANT CHANGE UP (ref 3.5–5)
PROTHROM AB SERPL-ACNC: 16.1 SEC — HIGH (ref 9.95–12.87)
RBC # BLD: 4.64 M/UL — LOW (ref 4.7–6.1)
RBC # FLD: 12.9 % — SIGNIFICANT CHANGE UP (ref 11.5–14.5)
SODIUM SERPL-SCNC: 138 MMOL/L — SIGNIFICANT CHANGE UP (ref 135–146)
WBC # BLD: 9.78 K/UL — SIGNIFICANT CHANGE UP (ref 4.8–10.8)
WBC # FLD AUTO: 9.78 K/UL — SIGNIFICANT CHANGE UP (ref 4.8–10.8)

## 2018-10-23 RX ORDER — CEFTRIAXONE 500 MG/1
2 INJECTION, POWDER, FOR SOLUTION INTRAMUSCULAR; INTRAVENOUS EVERY 24 HOURS
Qty: 0 | Refills: 0 | Status: DISCONTINUED | OUTPATIENT
Start: 2018-10-23 | End: 2018-10-29

## 2018-10-23 RX ORDER — DIPHENHYDRAMINE HCL 50 MG
12.5 CAPSULE ORAL AT BEDTIME
Qty: 0 | Refills: 0 | Status: DISCONTINUED | OUTPATIENT
Start: 2018-10-23 | End: 2018-10-23

## 2018-10-23 RX ORDER — DIPHENHYDRAMINE HCL 50 MG
12.5 CAPSULE ORAL AT BEDTIME
Qty: 0 | Refills: 0 | Status: COMPLETED | OUTPATIENT
Start: 2018-10-23 | End: 2018-10-23

## 2018-10-23 RX ADMIN — SODIUM CHLORIDE 125 MILLILITER(S): 9 INJECTION INTRAMUSCULAR; INTRAVENOUS; SUBCUTANEOUS at 00:08

## 2018-10-23 RX ADMIN — SODIUM CHLORIDE 125 MILLILITER(S): 9 INJECTION INTRAMUSCULAR; INTRAVENOUS; SUBCUTANEOUS at 23:50

## 2018-10-23 RX ADMIN — Medication 100 MILLIGRAM(S): at 05:59

## 2018-10-23 RX ADMIN — CEFTRIAXONE 100 GRAM(S): 500 INJECTION, POWDER, FOR SOLUTION INTRAMUSCULAR; INTRAVENOUS at 13:45

## 2018-10-23 RX ADMIN — SODIUM CHLORIDE 125 MILLILITER(S): 9 INJECTION INTRAMUSCULAR; INTRAVENOUS; SUBCUTANEOUS at 13:48

## 2018-10-23 RX ADMIN — Medication 100 MILLIGRAM(S): at 22:03

## 2018-10-23 RX ADMIN — Medication 650 MILLIGRAM(S): at 00:05

## 2018-10-23 NOTE — PROGRESS NOTE ADULT - SUBJECTIVE AND OBJECTIVE BOX
infectious diseases progress note:  KOURTNEY VEE is a 33yMale patient    PNEUMONIA        ROS:  not relevant     Allergies    No Known Allergies    Intolerances        ANTIBIOTICS/RELEVANT:  antimicrobials  cefTRIAXone   IVPB 2 Gram(s) IV Intermittent every 24 hours    immunologic:    OTHER:  acetaminophen   Tablet .. 650 milliGRAM(s) Oral every 6 hours PRN  ALBUTerol   0.042% 1.25 milliGRAM(s) Nebulizer every 4 hours PRN  benzonatate 100 milliGRAM(s) Oral every 8 hours  guaiFENesin    Syrup 200 milliGRAM(s) Oral every 6 hours PRN  heparin  Injectable 5000 Unit(s) SubCutaneous every 8 hours  ibuprofen  Tablet. 600 milliGRAM(s) Oral every 6 hours PRN  sodium chloride 0.9% Bolus 1000 milliLiter(s) IV Bolus once  sodium chloride 0.9% Bolus 1000 milliLiter(s) IV Bolus once  sodium chloride 0.9%. 1000 milliLiter(s) IV Continuous <Continuous>      Objective:  T(F): 100.4 (10-23-18 @ 05:59), Max: 102.4 (10-23-18 @ 00:01)  HR: 115 (10-23-18 @ 05:59) (104 - 119)  BP: 124/75 (10-23-18 @ 05:59) (122/76 - 132/88)  RR: 16 (10-23-18 @ 05:59) (16 - 16)  SpO2: --    PHYSICAL EXAM:  Constitutional:Well-developed, well nourished	  Neck:no JVD, no lymphadenopathy, supple  Respiratory: reduced BS bases marcy  Cardiovascular: S1S2 RRR, no murmurs  Gastrointestinal:soft, (+) BS, no HSM  Extremities:no phlebitis         LABS:                        12.4   9.78  )-----------( 312      ( 23 Oct 2018 06:48 )             37.2     10-23    138  |  98  |  9<L>  ----------------------------<  103<H>  4.2   |  24  |  0.7    Ca    9.1      23 Oct 2018 06:48    TPro  6.5  /  Alb  3.3<L>  /  TBili  0.3  /  DBili  x   /  AST  39  /  ALT  126<H>  /  AlkPhos  225<H>  10-22            MICROBIOLOGY:    Culture - Blood (collected 10-20-18 @ 13:15)  Source: .Blood Blood  Preliminary Report (10-21-18 @ 18:01):    No growth to date.    Culture - Blood (collected 10-20-18 @ 13:15)  Source: .Blood Blood  Preliminary Report (10-21-18 @ 18:01):    No growth to date.        Culture - Blood (collected 20 Oct 2018 13:15)  Source: .Blood Blood  Preliminary Report (21 Oct 2018 18:01):    No growth to date.    Culture - Blood (collected 20 Oct 2018 13:15)  Source: .Blood Blood  Preliminary Report (21 Oct 2018 18:01):    No growth to date.      Culture Results:   No growth to date. (10-20 @ 13:15)  Culture Results:   No growth to date. (10-20 @ 13:15)        RADIOLOGY & ADDITIONAL STUDIES:

## 2018-10-23 NOTE — CONSULT NOTE ADULT - ATTENDING COMMENTS
agree with above  will send for micro and cytology
General Thoracic Surgery Attestation    I have seen and examined the patient.  Where appropriate I have updated, edited, or corrected the resident's or PA's note with regard to findings, values, and plan.    likely empyema but somewhat confusing picture with normal wbc and apparently no US localizable fluid collection on US.  The lesion in his lung is likely an abscess that may have perforated into the pleural space.  Note is made of recent travel to [place].  Favor pleural space sampling, possible TPA, did discuss with patient that surgical therapy may very well be part of the treatment paradigm.

## 2018-10-23 NOTE — PROGRESS NOTE ADULT - ASSESSMENT
32 yo M with progressively worsening SOB, chest pain, cough and subjective fever.    # Sepsis presented on admission due to CAP. 32 yo M with progressively worsening SOB, chest pain, cough and subjective fever.    # Sepsis presented on admission due to CAP with loculated effusion.  - febrile, hemodynamically stable, still tachy  - discussed with Pulmonary - pt will need pigtail, going today, consult placed, Dr. Munoz discussed with IR.  - continue Ceftriaxone 2 gr Q24 IV, Levaquin 750 IV  - continue IVF  - resp Cx not collected, BCx x2 prelim NGTD  - Legionella, RVP negative,  Mycoplasma pend  - CTS consult placed, pt will might need VATS   - ID on board    #Transaminitis   - trending down, US liver normal    DVT ppx  Full code

## 2018-10-23 NOTE — CONSULT NOTE ADULT - SUBJECTIVE AND OBJECTIVE BOX
34 yo M with no significant MHx presented with c/o SOB on exertion, chest pain started 2 weeks ago and gotten progressively worse, 1 week ago cough started and last 4 nights pt had subjective fever. He traveled to [place] a month ago, works as a . Denies sick contacts. He describes pain as sharp, worse with deep breath, do not radiate anywhere, alleviates with Advil. (20 Oct 2018 17:58)    Cardiothoracic consult called because CT chest revealed  right pleural effusiuon and possible lower lobe atalectasis vs abscess. Pt to have Ir eval for pigtail catheter placement.      PAST MEDICAL & SURGICAL HISTORY:  No pertinent past medical history  S/P cholecystectomy      MEDICATIONS  (STANDING):  benzonatate 100 milliGRAM(s) Oral every 8 hours  cefTRIAXone   IVPB 2 Gram(s) IV Intermittent every 24 hours  heparin  Injectable 5000 Unit(s) SubCutaneous every 8 hours  levoFLOXacin IVPB 750 milliGRAM(s) IV Intermittent every 24 hours  sodium chloride 0.9% Bolus 1000 milliLiter(s) IV Bolus once  sodium chloride 0.9% Bolus 1000 milliLiter(s) IV Bolus once  sodium chloride 0.9%. 1000 milliLiter(s) (125 mL/Hr) IV Continuous <Continuous>    MEDICATIONS  (PRN):  acetaminophen   Tablet .. 650 milliGRAM(s) Oral every 6 hours PRN Temp greater or equal to 38C (100.4F)  ALBUTerol   0.042% 1.25 milliGRAM(s) Nebulizer every 4 hours PRN Shortness of Breath  guaiFENesin    Syrup 200 milliGRAM(s) Oral every 6 hours PRN Cough  ibuprofen  Tablet. 600 milliGRAM(s) Oral every 6 hours PRN Mild Pain (1 - 3)      Allergies  No Known Allergies    SOCIAL HISTORY: denies/tob/etoh    FAMILY HISTORY:  No pertinent family history in first degree relatives      Vital Signs Last 24 Hrs  T(C): 38 (23 Oct 2018 05:59), Max: 39.1 (23 Oct 2018 00:01)  T(F): 100.4 (23 Oct 2018 05:59), Max: 102.4 (23 Oct 2018 00:01)  HR: 115 (23 Oct 2018 05:59) (104 - 119)  BP: 124/75 (23 Oct 2018 05:59) (122/76 - 132/88)  BP(mean): --  RR: 16 (23 Oct 2018 05:59) (16 - 16)  SpO2: --    PHYSICAL EXAM:    lungs: cta, decreased Rt side  cvs: s1s2  abd: soft, NT/ND        LABS:                        12.4   9.78  )-----------( 312      ( 23 Oct 2018 06:48 )             37.2     10-23    138  |  98  |  9<L>  ----------------------------<  103<H>  4.2   |  24  |  0.7    Ca    9.1      23 Oct 2018 06:48    TPro  6.5  /  Alb  3.3<L>  /  TBili  0.3  /  DBili  x   /  AST  39  /  ALT  126<H>  /  AlkPhos  225<H>  10-22      RADIOLOGY & ADDITIONAL STUDIES:    CT Angio Chest w/ IV Cont (10.20.18 @ 15:26) >    IMPRESSION:  1. Small right pleural effusion with right lower lobe atelectasis and   suggestion of posterior right lower lobe 5 cm mass. Further evaluation   with PET or short interval 4 week CT chest follow-up recommended, as   malignancy is a high consideration.    2. No evidence of acute pulmonary emboli.       Xray Chest 2 Views PA/Lat (10.22.18 @ 15:59) >  Impression:      Significant interval increase in right-sided parenchymal opacity and   effusion which appears loculated at the level of the midlung. 32 yo M with no significant MHx presented with c/o SOB on exertion, chest pain started 2 weeks ago and gotten progressively worse, 1 week ago cough started and last 4 nights pt had subjective fever. He traveled to [place] a month ago, works as a . Denies sick contacts. He describes pain as sharp, worse with deep breath, do not radiate anywhere, alleviates with Advil. (20 Oct 2018 17:58)    Cardiothoracic consult called because CT chest revealed  right pleural effusiuon and possible lower lobe atalectasis vs abscess. Pt to have Ir eval for pigtail catheter placement.      PAST MEDICAL & SURGICAL HISTORY:  No pertinent past medical history  S/P cholecystectomy      MEDICATIONS  (STANDING):  benzonatate 100 milliGRAM(s) Oral every 8 hours  cefTRIAXone   IVPB 2 Gram(s) IV Intermittent every 24 hours  heparin  Injectable 5000 Unit(s) SubCutaneous every 8 hours  levoFLOXacin IVPB 750 milliGRAM(s) IV Intermittent every 24 hours  sodium chloride 0.9% Bolus 1000 milliLiter(s) IV Bolus once  sodium chloride 0.9% Bolus 1000 milliLiter(s) IV Bolus once  sodium chloride 0.9%. 1000 milliLiter(s) (125 mL/Hr) IV Continuous <Continuous>    MEDICATIONS  (PRN):  acetaminophen   Tablet .. 650 milliGRAM(s) Oral every 6 hours PRN Temp greater or equal to 38C (100.4F)  ALBUTerol   0.042% 1.25 milliGRAM(s) Nebulizer every 4 hours PRN Shortness of Breath  guaiFENesin    Syrup 200 milliGRAM(s) Oral every 6 hours PRN Cough  ibuprofen  Tablet. 600 milliGRAM(s) Oral every 6 hours PRN Mild Pain (1 - 3)      Allergies  No Known Allergies    SOCIAL HISTORY: denies/tob/etoh/drugs    FAMILY HISTORY:  diabetes    Vital Signs Last 24 Hrs  T(C): 38 (23 Oct 2018 05:59), Max: 39.1 (23 Oct 2018 00:01)  T(F): 100.4 (23 Oct 2018 05:59), Max: 102.4 (23 Oct 2018 00:01)  HR: 115 (23 Oct 2018 05:59) (104 - 119)  BP: 124/75 (23 Oct 2018 05:59) (122/76 - 132/88)  BP(mean): --  RR: 16 (23 Oct 2018 05:59) (16 - 16)  SpO2: --    PHYSICAL EXAM:    lungs: cta, decreased Rt side  cvs: s1s2  abd: soft, NT/ND        LABS:                        12.4   9.78  )-----------( 312      ( 23 Oct 2018 06:48 )             37.2     10-23    138  |  98  |  9<L>  ----------------------------<  103<H>  4.2   |  24  |  0.7    Ca    9.1      23 Oct 2018 06:48    TPro  6.5  /  Alb  3.3<L>  /  TBili  0.3  /  DBili  x   /  AST  39  /  ALT  126<H>  /  AlkPhos  225<H>  10-22      RADIOLOGY & ADDITIONAL STUDIES:    CT Angio Chest w/ IV Cont (10.20.18 @ 15:26) >    IMPRESSION:  1. Small right pleural effusion with right lower lobe atelectasis and   suggestion of posterior right lower lobe 5 cm mass. Further evaluation   with PET or short interval 4 week CT chest follow-up recommended, as   malignancy is a high consideration.    2. No evidence of acute pulmonary emboli.       Xray Chest 2 Views PA/Lat (10.22.18 @ 15:59) >  Impression:      Significant interval increase in right-sided parenchymal opacity and   effusion which appears loculated at the level of the midlung.

## 2018-10-23 NOTE — PROGRESS NOTE ADULT - SUBJECTIVE AND OBJECTIVE BOX
KOURTNEY VEE    Patient is a 33y old  Male who presents with a chief complaint of SOB on exertion, chest pain and cough. (23 Oct 2018 10:27)    HPI:  32 yo M with no significant MHx presented with c/o SOB on exertion, chest pain started 2 weeks ago and gotten progressively worse, 1 week ago cough started and last 4 nights pt had subjective fever. He traveled to [place] a month ago, works as a . Denies sick contacts. He describes pain as sharp, worse with deep breath, do not radiate anywhere, alleviates with Advil. Admitted with impression of sepsis due to PNA with loculated effusion.     INTERVAL HPI/OVERNIGHT EVENTS: spiked high grade fever, CXR worse, pt himself feels better.    ROS: All ROS negative except cough and pleuritic chest pain    PHYSICAL EXAM:  T(C): 38, Max: 39.1 (10-23-18 @ 00:01)  HR: 115 (104 - 119)  BP: 124/75 (122/76 - 132/88)  RR: 16 (16 - 16)  SpO2: --    GENERAL: NAD  NECK: Supple, No JVD  PULMONARY/CHEST: Right side breath sounds decreased, worse since admission  CARDIOVASC: Regular rate and rhythm; No murmurs  GI/ABDOMEN: Soft, Nontender, Nondistended; Bowel sounds present  EXTREMITIES:  2+ Peripheral Pulses, No clubbing, cyanosis, or edema, no deformity. No calf tenderness b/l.  SKIN: No rashes or lesions  NERVOUS SYSTEM:  Alert & Oriented X3, no focal deficit     Consultant(s) Notes Reviewed by me.   Care Discussed with Consultants/Other Providers     LABS:                        12.4   9.78  )-----------( 312      ( 23 Oct 2018 06:48 )             37.2     10-23    138  |  98  |  9<L>  ----------------------------<  103<H>  4.2   |  24  |  0.7    Ca    9.1      23 Oct 2018 06:48    TPro  6.5  /  Alb  3.3<L>  /  TBili  0.3  /  DBili  x   /  AST  39  /  ALT  126<H>  /  AlkPhos  225<H>  10-22      Culture - Blood (collected 20 Oct 2018 13:15)  Source: .Blood Blood  Preliminary Report (21 Oct 2018 18:01):    No growth to date.    Culture - Blood (collected 20 Oct 2018 13:15)  Source: .Blood Blood  Preliminary Report (21 Oct 2018 18:01):    No growth to date.      MEDICATIONS  (STANDING):  benzonatate 100 milliGRAM(s) Oral every 8 hours  cefTRIAXone   IVPB 2 Gram(s) IV Intermittent every 24 hours  heparin  Injectable 5000 Unit(s) SubCutaneous every 8 hours  levoFLOXacin IVPB 750 milliGRAM(s) IV Intermittent every 24 hours  sodium chloride 0.9% Bolus 1000 milliLiter(s) IV Bolus once  sodium chloride 0.9% Bolus 1000 milliLiter(s) IV Bolus once  sodium chloride 0.9%. 1000 milliLiter(s) (125 mL/Hr) IV Continuous <Continuous>    MEDICATIONS  (PRN):  acetaminophen   Tablet .. 650 milliGRAM(s) Oral every 6 hours PRN Temp greater or equal to 38C (100.4F)  ALBUTerol   0.042% 1.25 milliGRAM(s) Nebulizer every 4 hours PRN Shortness of Breath  guaiFENesin    Syrup 200 milliGRAM(s) Oral every 6 hours PRN Cough  ibuprofen  Tablet. 600 milliGRAM(s) Oral every 6 hours PRN Mild Pain (1 - 3)

## 2018-10-23 NOTE — CONSULT NOTE ADULT - ASSESSMENT
31 yr old male with SOB and cough progresive over 2 weeks, now with Rt PNA, Stil having persisitent fevers    1. R/O right empyema      -f/u IR for pigtail placement   - will make further recommendations based on pt's progression after catheter placement 33 yr old male with SOB and cough progresive over 2 weeks, now with Rt PNA, Stil having persisitent fevers    1. R/O right empyema      -f/u IR for pigtail placement   - will make further recommendations based on pt's progression after catheter placement

## 2018-10-23 NOTE — CONSULT NOTE ADULT - SUBJECTIVE AND OBJECTIVE BOX
INTERVENTIONAL RADIOLOGY CONSULT:     Procedure Requested: image guided right thoracostomy tube placement    HPI:    32 yo M with no significant MHx presented with c/o SOB on exertion, chest pain started 2 weeks ago and gotten progressively worse, 1 week ago cough started and last 4 nights pt had subjective fever. He traveled to [place] a month ago, works as a . Denies sick contacts. He describes pain as sharp, worse with deep breath, do not radiate anywhere, alleviates with Advil.    Pulmonology attempted bedside placement of thoracostomy tube but were unsuccessful. IR was then consulted for image guided thoracostomy tube placement.    PAST MEDICAL & SURGICAL HISTORY:  No pertinent past medical history  S/P cholecystectomy    MEDICATIONS  (STANDING):  benzonatate 100 milliGRAM(s) Oral every 8 hours  cefTRIAXone   IVPB 2 Gram(s) IV Intermittent every 24 hours  diphenhydrAMINE   Elixir 12.5 milliGRAM(s) Oral at bedtime  heparin  Injectable 5000 Unit(s) SubCutaneous every 8 hours  levoFLOXacin IVPB 750 milliGRAM(s) IV Intermittent every 24 hours  sodium chloride 0.9% Bolus 1000 milliLiter(s) IV Bolus once  sodium chloride 0.9% Bolus 1000 milliLiter(s) IV Bolus once  sodium chloride 0.9%. 1000 milliLiter(s) (125 mL/Hr) IV Continuous <Continuous>    MEDICATIONS  (PRN):  acetaminophen   Tablet .. 650 milliGRAM(s) Oral every 6 hours PRN Temp greater or equal to 38C (100.4F)  ALBUTerol   0.042% 1.25 milliGRAM(s) Nebulizer every 4 hours PRN Shortness of Breath  guaiFENesin    Syrup 200 milliGRAM(s) Oral every 6 hours PRN Cough  ibuprofen  Tablet. 600 milliGRAM(s) Oral every 6 hours PRN Mild Pain (1 - 3)    Allergies  No Known Allergies    Social History:   Smoking: Yes [ ]  No [ ]   ______pk yrs  ETOH  Yes [ ]  No [ ]  Social [ ]  DRUGS:  Yes [ ]  No [ ]  if so what______________    FAMILY HISTORY:  No pertinent family history in first degree relatives    Physical Exam:   Vital Signs Last 24 Hrs  T(C): 37.8 (23 Oct 2018 14:00), Max: 39.1 (23 Oct 2018 00:01)  T(F): 100 (23 Oct 2018 14:00), Max: 102.4 (23 Oct 2018 00:01)  HR: 110 (23 Oct 2018 14:00) (104 - 119)  BP: 123/74 (23 Oct 2018 14:00) (122/76 - 131/86)  BP(mean): --  RR: 16 (23 Oct 2018 14:00) (16 - 16)    Labs:                         12.4   9.78  )-----------( 312      ( 23 Oct 2018 06:48 )             37.2     10-23    138  |  98  |  9<L>  ----------------------------<  103<H>  4.2   |  24  |  0.7    Ca    9.1      23 Oct 2018 06:48    TPro  6.5  /  Alb  3.3<L>  /  TBili  0.3  /  DBili  x   /  AST  39  /  ALT  126<H>  /  AlkPhos  225<H>  10-22    Radiology & Additional Studies:     10/20 - CT chest - Right lower lobe atelectasis with effusion    A/P - 32 y/o M w/right lower lobe atelectassis with effusion; IR consulted for image guided right thoracostomy tube placement    1. Right pleural effusion - Plan for image guided right thoracostomy tube placement on 10/24 with IR. IR RN staff will contact [facility] to have patient transferred to [facility] for procedure; tentatively scheduled as first case with IR.    Thank you for the courtesy of this consult, please call [phone] with any further questions.

## 2018-10-23 NOTE — PROGRESS NOTE ADULT - PROBLEM SELECTOR PLAN 1
with para pneumonic effusion    HOLD beta lactams  - persistent fevers  If still febrile off abx - repeat Ct Chest and ? tap / surgery  pt is clinically better    at least 14 days of abx

## 2018-10-23 NOTE — PROGRESS NOTE ADULT - SUBJECTIVE AND OBJECTIVE BOX
Patient is a 33y old  Male who presents with a chief complaint of SOB on exertion, chest pain and cough. (23 Oct 2018 08:41)      INTERVAL HISTORY/overnight events spike temp again clinically said feel better but cxr worse and increase temp         Vital Signs Last 24 Hrs  T(C): 38 (23 Oct 2018 05:59), Max: 39.1 (23 Oct 2018 00:01)  T(F): 100.4 (23 Oct 2018 05:59), Max: 102.4 (23 Oct 2018 00:01)  HR: 115 (23 Oct 2018 05:59) (104 - 119)  BP: 124/75 (23 Oct 2018 05:59) (122/76 - 132/88)  BP(mean): --  RR: 16 (23 Oct 2018 05:59) (16 - 16)  SpO2: --  Daily     Daily   I&O's Summary      Physical Examination:    General: No acute distress.  Alert, oriented, interactive, nonfocal    HEENT: Pupils equal, reactive to light.  Symmetric.    PULM: decrease right side     CVS: Regular rate and rhythm, no murmurs, rubs, or gallops    ABD: Soft, nondistended, nontender, normoactive bowel sounds, no masses    EXT: No edema, nontender    SKIN: Warm and well perfused, no rashes noted.    Neurology:    Musculoskeletal : Move all extremety         Lab Results:                        12.4   9.78  )-----------( 312      ( 23 Oct 2018 06:48 )             37.2     23 Oct 2018 06:48    138    |  98     |  9      ----------------------------<  103    4.2     |  24     |  0.7      Ca    9.1        23 Oct 2018 06:48                Microbiology    Culture - Blood (collected 20 Oct 2018 13:15)  Source: .Blood Blood  Preliminary Report (21 Oct 2018 18:01):    No growth to date.    Culture - Blood (collected 20 Oct 2018 13:15)  Source: .Blood Blood  Preliminary Report (21 Oct 2018 18:01):    No growth to date.        Medication:  MEDICATIONS  (STANDING):  benzonatate 100 milliGRAM(s) Oral every 8 hours  heparin  Injectable 5000 Unit(s) SubCutaneous every 8 hours  levoFLOXacin IVPB 750 milliGRAM(s) IV Intermittent every 24 hours  sodium chloride 0.9% Bolus 1000 milliLiter(s) IV Bolus once  sodium chloride 0.9% Bolus 1000 milliLiter(s) IV Bolus once  sodium chloride 0.9%. 1000 milliLiter(s) (125 mL/Hr) IV Continuous <Continuous>    MEDICATIONS  (PRN):  acetaminophen   Tablet .. 650 milliGRAM(s) Oral every 6 hours PRN Temp greater or equal to 38C (100.4F)  ALBUTerol   0.042% 1.25 milliGRAM(s) Nebulizer every 4 hours PRN Shortness of Breath  guaiFENesin    Syrup 200 milliGRAM(s) Oral every 6 hours PRN Cough  ibuprofen  Tablet. 600 milliGRAM(s) Oral every 6 hours PRN Mild Pain (1 - 3)        IMAGING STUDIES:

## 2018-10-23 NOTE — CONSULT NOTE ADULT - REASON FOR ADMISSION
SOB on exertion, chest pain and cough.

## 2018-10-23 NOTE — PROGRESS NOTE ADULT - ASSESSMENT
Impression:  most likely PNA with parapneumonic effusion   doubt malignancy         Plan: case discussed with Ct surgery with arrange for IR to place pig tail send fluid for culture and cytology   if no improvement might need VATS   case discussed with IR   follow pan cx   ID follow   on Rocephine IV 2 gram   Iv Levaquin 750 mg  follow cx Bld cx   case discussed with patient and Hospitalist   so he will go for pig tail today

## 2018-10-24 LAB
ANION GAP SERPL CALC-SCNC: 16 MMOL/L — HIGH (ref 7–14)
B PERT IGG+IGM PNL SER: ABNORMAL
BUN SERPL-MCNC: 9 MG/DL — LOW (ref 10–20)
CALCIUM SERPL-MCNC: 8.8 MG/DL — SIGNIFICANT CHANGE UP (ref 8.5–10.1)
CHLORIDE SERPL-SCNC: 101 MMOL/L — SIGNIFICANT CHANGE UP (ref 98–110)
CO2 SERPL-SCNC: 22 MMOL/L — SIGNIFICANT CHANGE UP (ref 17–32)
COLOR FLD: YELLOW — SIGNIFICANT CHANGE UP
CREAT SERPL-MCNC: 0.8 MG/DL — SIGNIFICANT CHANGE UP (ref 0.7–1.5)
FLUID INTAKE SUBSTANCE CLASS: SIGNIFICANT CHANGE UP
FLUID SEGMENTED GRANULOCYTES: 71 % — SIGNIFICANT CHANGE UP
GLUCOSE SERPL-MCNC: 109 MG/DL — HIGH (ref 70–99)
HCT VFR BLD CALC: 39.4 % — LOW (ref 42–52)
HGB BLD-MCNC: 12.7 G/DL — LOW (ref 14–18)
LYMPHOCYTES # FLD: 25 — SIGNIFICANT CHANGE UP
M PNEUMO IGM SER-ACNC: 177 UNITS/ML — SIGNIFICANT CHANGE UP
MCHC RBC-ENTMCNC: 26.3 PG — LOW (ref 27–31)
MCHC RBC-ENTMCNC: 32.2 G/DL — SIGNIFICANT CHANGE UP (ref 32–37)
MCV RBC AUTO: 81.7 FL — SIGNIFICANT CHANGE UP (ref 80–94)
MONOS+MACROS # FLD: 4 % — SIGNIFICANT CHANGE UP
MYCOPLASMA AG SPEC QL: NEGATIVE — SIGNIFICANT CHANGE UP
NRBC # BLD: 0 /100 WBCS — SIGNIFICANT CHANGE UP (ref 0–0)
PLATELET # BLD AUTO: 384 K/UL — SIGNIFICANT CHANGE UP (ref 130–400)
POTASSIUM SERPL-MCNC: 4.5 MMOL/L — SIGNIFICANT CHANGE UP (ref 3.5–5)
POTASSIUM SERPL-SCNC: 4.5 MMOL/L — SIGNIFICANT CHANGE UP (ref 3.5–5)
RBC # BLD: 4.82 M/UL — SIGNIFICANT CHANGE UP (ref 4.7–6.1)
RBC # FLD: 13.1 % — SIGNIFICANT CHANGE UP (ref 11.5–14.5)
RCV VOL RI: 5000 /UL — HIGH (ref 0–5)
SODIUM SERPL-SCNC: 139 MMOL/L — SIGNIFICANT CHANGE UP (ref 135–146)
TOTAL NUCLEATED CELL COUNT, BODY FLUID: 1792 /UL — HIGH (ref 0–5)
TUBE TYPE: SIGNIFICANT CHANGE UP
WBC # BLD: 12.05 K/UL — HIGH (ref 4.8–10.8)
WBC # FLD AUTO: 12.05 K/UL — HIGH (ref 4.8–10.8)

## 2018-10-24 RX ADMIN — Medication 100 MILLIGRAM(S): at 23:09

## 2018-10-24 RX ADMIN — CEFTRIAXONE 100 GRAM(S): 500 INJECTION, POWDER, FOR SOLUTION INTRAMUSCULAR; INTRAVENOUS at 14:42

## 2018-10-24 RX ADMIN — Medication 600 MILLIGRAM(S): at 18:50

## 2018-10-24 RX ADMIN — Medication 600 MILLIGRAM(S): at 19:28

## 2018-10-24 RX ADMIN — Medication 100 MILLIGRAM(S): at 14:42

## 2018-10-24 NOTE — CHART NOTE - NSCHARTNOTEFT_GEN_A_CORE
Patient was transferred to the [facility] side before being seen by me.  This transfer note was written after reviewing his whole hospital course.      Patient is a 32 yo M with no significant PMHx. He presented with c/o SOB on exertion & chest pain  which started 2 weeks ago and had gotten progressively worse. He started having a cough 1 week ago cough and subjective fevers 4 days PTP. He works as a  and mentioned travelling to [place] a month ago but denied any sick contacts. Chest pain was described as sharp, pleuritic in nature with no radiation and alleviated with Advil. work up done in the ED showed a small right pleural effusion with right lower lobe atelectasis and suggestion of posterior right lower lobe 5 cm mass. He was admitted for further management.    Assessment and Plan:    1. Lung mass with pleural effusion:  ? Infectious vs. malignancy (Low risk for malignancy given history).   Patient seen by Pulmonology: recommended treatment for Pneumonia with Parapneumonic Effusion.  Started on Rocephin and Levaquin.  Pulm alos recommended IR guided    - low risk for malignancy - non smoker, no FHx, no aerosol lung irritants at work  - will treat for PNA given fever, tachycardia with Ceftriaxone and Azithromycin   - IVF  - resp Cx, BCx x2  Blood cultures: negative.   Plan: case discussed with Ct surgery with arrange for IR to place pig tail send fluid for culture and cytology   if no improvement might need VATS   case discussed with IR   follow pan cx   ID follow   on Rocephine IV 2 gram   Iv Levaquin 750 mg  follow cx Bld cx   case discussed with patient and Hospitalist   so he will go for pig tail today     2. No evidence of acute pulmonary emboli.      #Transaminitis   - will repeat, if still elevated will consider US liver Patient was [facility] before being seen by me.  This transfer note was written after reviewing his whole hospital course [place] [facility] [place] [facility].      Patient is a 34 yo M with no significant PMHx. He presented with c/o SOB on exertion & chest pain  which started 2 weeks ago and had gotten progressively worse. He started having a cough 1 week ago cough and subjective fevers 4 days PTP. He works as a  and mentioned travelling to [place] a month ago but denied any sick contacts. Chest pain was described as sharp, pleuritic in nature with no radiation and alleviated with Advil. work up done in the ED showed a small right pleural effusion with right lower lobe atelectasis and suggestion of posterior right lower lobe 5 cm mass. He was admitted for further management.      Assessment and Plan:    1. Lung mass with pleural effusion:  ? Infectious vs. malignancy (Low risk for malignancy given history).   Patient seen by Pulmonology: recommended treatment for Pneumonia with Parapneumonic Effusion.  Started on Rocephin and Levaquin.  Pulm also recommended CT surgery consultation and placement of a pig tail by IR to send fluid for culture and cytology.  If no improvement might need VATS.   Blood cultures: negative.   ID also following: Recommended at least a 14 day course of Antibiotics and HOLDING Beta lactams if fever persisted.      Patient sent to the [facility] for Pig tail placement by IR today.  Per CT surgery: Dr. Mixon would like to keep the patient on his service for further management.   Transfer order placed.           2. Transaminitis:  RUQ ultrasound showed: no biliary ductal dilatation. No definite sonographic evidence for hepatic abnormality.  Please trend LFTs.

## 2018-10-24 NOTE — CHART NOTE - NSCHARTNOTEFT_GEN_A_CORE
Surgery post-op Note 10-24-18 @ 21:37    Procedure:  CT guided right thoracentesis and drain placement    S: 33yMale s/p  CT guided right thoracentesis and drain placement. Patient is laying comfortably in the bed. Denies any pain at the incision site, fever, chills, chest pain, shortness of breath.    O:   T(C): 36.6 (10-24-18 @ 20:58), Max: 37.9 (10-23-18 @ 22:05)  HR: 104 (10-24-18 @ 20:58) (99 - 113)  BP: 132/91 (10-24-18 @ 20:58) (123/82 - 132/91)  RR: 18 (10-24-18 @ 20:58) (16 - 18)  SpO2: --  General: AAOx 3. NAD  Heart: S1 and S2 noted  Lungs: Clear to auscultation bilaterally  Abdomen: Soft Non tender, Non distended. Bowel sounds present  Wound: No bleeding or discharge noted from the incision site      10-23-18 @ 07:01  -  10-24-18 @ 07:00  --------------------------------------------------------  IN: 1050 mL / OUT: 0 mL / NET: 1050 mL    acetaminophen   Tablet .. 650 milliGRAM(s) Oral every 6 hours PRN  ALBUTerol   0.042% 1.25 milliGRAM(s) Nebulizer every 4 hours PRN  benzonatate 100 milliGRAM(s) Oral every 8 hours  cefTRIAXone   IVPB 2 Gram(s) IV Intermittent every 24 hours  guaiFENesin    Syrup 200 milliGRAM(s) Oral every 6 hours PRN  heparin  Injectable 5000 Unit(s) SubCutaneous every 8 hours  ibuprofen  Tablet. 600 milliGRAM(s) Oral every 6 hours PRN  levoFLOXacin IVPB 750 milliGRAM(s) IV Intermittent every 24 hours  sodium chloride 0.9% Bolus 1000 milliLiter(s) IV Bolus once  sodium chloride 0.9% Bolus 1000 milliLiter(s) IV Bolus once  sodium chloride 0.9%. 1000 milliLiter(s) IV Continuous <Continuous>    Assessment:  33y Male s/p  CT guided right thoracentesis and drain placement    Plan:  Pain control  f/u CT output  f/u AM cxr Surgery post-op Note 10-24-18 @ 21:37    Procedure:  CT guided right thoracentesis and drain placement    S: 33yMale s/p  CT guided right thoracentesis and drain placement. Patient is laying comfortably in the bed. Denies any pain at the incision site, fever, chills, chest pain, shortness of breath.    O:   T(C): 36.6 (10-24-18 @ 20:58), Max: 37.9 (10-23-18 @ 22:05)  HR: 104 (10-24-18 @ 20:58) (99 - 113)  BP: 132/91 (10-24-18 @ 20:58) (123/82 - 132/91)  RR: 18 (10-24-18 @ 20:58) (16 - 18)  SpO2: --  General: AAOx 3. NAD  Heart: S1 and S2 noted  Lungs: Clear to auscultation bilaterally  Abdomen: Soft Non tender, Non distended. Bowel sounds present  Wound: No bleeding or discharge noted from the incision site      10-23-18 @ 07:01  -  10-24-18 @ 07:00  --------------------------------------------------------  IN: 1050 mL / OUT: 0 mL / NET: 1050 mL    acetaminophen   Tablet .. 650 milliGRAM(s) Oral every 6 hours PRN  ALBUTerol   0.042% 1.25 milliGRAM(s) Nebulizer every 4 hours PRN  benzonatate 100 milliGRAM(s) Oral every 8 hours  cefTRIAXone   IVPB 2 Gram(s) IV Intermittent every 24 hours  guaiFENesin    Syrup 200 milliGRAM(s) Oral every 6 hours PRN  heparin  Injectable 5000 Unit(s) SubCutaneous every 8 hours  ibuprofen  Tablet. 600 milliGRAM(s) Oral every 6 hours PRN  levoFLOXacin IVPB 750 milliGRAM(s) IV Intermittent every 24 hours  sodium chloride 0.9% Bolus 1000 milliLiter(s) IV Bolus once  sodium chloride 0.9% Bolus 1000 milliLiter(s) IV Bolus once  sodium chloride 0.9%. 1000 milliLiter(s) IV Continuous <Continuous>    Assessment:  33y Male s/p  CT guided right thoracentesis and drain placement    Plan:  Pain control  f/u drain output  f/u AM cxr  f/u cultures  NPO after midnight for possible procedure tomorrow with CT surgery  ambulate as tolerated with CT of suction

## 2018-10-25 LAB
ALBUMIN SERPL ELPH-MCNC: 3.3 G/DL — LOW (ref 3.5–5.2)
ALP SERPL-CCNC: 309 U/L — HIGH (ref 30–115)
ALT FLD-CCNC: 176 U/L — HIGH (ref 0–41)
ANION GAP SERPL CALC-SCNC: 14 MMOL/L — SIGNIFICANT CHANGE UP (ref 7–14)
APTT BLD: 36 SEC — SIGNIFICANT CHANGE UP (ref 27–39.2)
AST SERPL-CCNC: 82 U/L — HIGH (ref 0–41)
BASOPHILS # BLD AUTO: 0.03 K/UL — SIGNIFICANT CHANGE UP (ref 0–0.2)
BASOPHILS NFR BLD AUTO: 0.3 % — SIGNIFICANT CHANGE UP (ref 0–1)
BILIRUB DIRECT SERPL-MCNC: <0.2 MG/DL — SIGNIFICANT CHANGE UP (ref 0–0.2)
BILIRUB INDIRECT FLD-MCNC: >0.1 MG/DL — LOW (ref 0.2–1.2)
BILIRUB SERPL-MCNC: 0.3 MG/DL — SIGNIFICANT CHANGE UP (ref 0.2–1.2)
BUN SERPL-MCNC: 10 MG/DL — SIGNIFICANT CHANGE UP (ref 10–20)
CALCIUM SERPL-MCNC: 8.9 MG/DL — SIGNIFICANT CHANGE UP (ref 8.5–10.1)
CHLORIDE SERPL-SCNC: 99 MMOL/L — SIGNIFICANT CHANGE UP (ref 98–110)
CO2 SERPL-SCNC: 26 MMOL/L — SIGNIFICANT CHANGE UP (ref 17–32)
CREAT SERPL-MCNC: 0.8 MG/DL — SIGNIFICANT CHANGE UP (ref 0.7–1.5)
CULTURE RESULTS: SIGNIFICANT CHANGE UP
CULTURE RESULTS: SIGNIFICANT CHANGE UP
EOSINOPHIL # BLD AUTO: 0.16 K/UL — SIGNIFICANT CHANGE UP (ref 0–0.7)
EOSINOPHIL NFR BLD AUTO: 1.8 % — SIGNIFICANT CHANGE UP (ref 0–8)
GLUCOSE SERPL-MCNC: 105 MG/DL — HIGH (ref 70–99)
HCT VFR BLD CALC: 37.5 % — LOW (ref 42–52)
HGB BLD-MCNC: 11.9 G/DL — LOW (ref 14–18)
IMM GRANULOCYTES NFR BLD AUTO: 1.4 % — HIGH (ref 0.1–0.3)
INR BLD: 1.4 RATIO — HIGH (ref 0.65–1.3)
LDH SERPL L TO P-CCNC: 1252 U/L — SIGNIFICANT CHANGE UP
LDH SERPL L TO P-CCNC: 291 U/L — HIGH (ref 50–242)
LYMPHOCYTES # BLD AUTO: 2.34 K/UL — SIGNIFICANT CHANGE UP (ref 1.2–3.4)
LYMPHOCYTES # BLD AUTO: 26.6 % — SIGNIFICANT CHANGE UP (ref 20.5–51.1)
MAGNESIUM SERPL-MCNC: 2.4 MG/DL — SIGNIFICANT CHANGE UP (ref 1.8–2.4)
MCHC RBC-ENTMCNC: 25.9 PG — LOW (ref 27–31)
MCHC RBC-ENTMCNC: 31.7 G/DL — LOW (ref 32–37)
MCV RBC AUTO: 81.5 FL — SIGNIFICANT CHANGE UP (ref 80–94)
MONOCYTES # BLD AUTO: 0.79 K/UL — HIGH (ref 0.1–0.6)
MONOCYTES NFR BLD AUTO: 9 % — SIGNIFICANT CHANGE UP (ref 1.7–9.3)
NEUTROPHILS # BLD AUTO: 5.36 K/UL — SIGNIFICANT CHANGE UP (ref 1.4–6.5)
NEUTROPHILS NFR BLD AUTO: 60.9 % — SIGNIFICANT CHANGE UP (ref 42.2–75.2)
NRBC # BLD: 0 /100 WBCS — SIGNIFICANT CHANGE UP (ref 0–0)
PHOSPHATE SERPL-MCNC: 3.8 MG/DL — SIGNIFICANT CHANGE UP (ref 2.1–4.9)
PLATELET # BLD AUTO: 302 K/UL — SIGNIFICANT CHANGE UP (ref 130–400)
POTASSIUM SERPL-MCNC: 4.5 MMOL/L — SIGNIFICANT CHANGE UP (ref 3.5–5)
POTASSIUM SERPL-SCNC: 4.5 MMOL/L — SIGNIFICANT CHANGE UP (ref 3.5–5)
PROT SERPL-MCNC: 7.1 G/DL — SIGNIFICANT CHANGE UP (ref 6–8)
PROTHROM AB SERPL-ACNC: 16.1 SEC — HIGH (ref 9.95–12.87)
RBC # BLD: 4.6 M/UL — LOW (ref 4.7–6.1)
RBC # FLD: 13.2 % — SIGNIFICANT CHANGE UP (ref 11.5–14.5)
SODIUM SERPL-SCNC: 139 MMOL/L — SIGNIFICANT CHANGE UP (ref 135–146)
SPECIMEN SOURCE: SIGNIFICANT CHANGE UP
SPECIMEN SOURCE: SIGNIFICANT CHANGE UP
TYPE + AB SCN PNL BLD: SIGNIFICANT CHANGE UP
WBC # BLD: 8.8 K/UL — SIGNIFICANT CHANGE UP (ref 4.8–10.8)
WBC # FLD AUTO: 8.8 K/UL — SIGNIFICANT CHANGE UP (ref 4.8–10.8)

## 2018-10-25 RX ORDER — ALTEPLASE 100 MG
10 KIT INTRAVENOUS ONCE
Qty: 0 | Refills: 0 | Status: DISCONTINUED | OUTPATIENT
Start: 2018-10-25 | End: 2018-10-29

## 2018-10-25 RX ORDER — SODIUM CHLORIDE 9 MG/ML
1000 INJECTION INTRAMUSCULAR; INTRAVENOUS; SUBCUTANEOUS
Qty: 0 | Refills: 0 | Status: DISCONTINUED | OUTPATIENT
Start: 2018-10-25 | End: 2018-10-25

## 2018-10-25 RX ORDER — MORPHINE SULFATE 50 MG/1
2 CAPSULE, EXTENDED RELEASE ORAL ONCE
Qty: 0 | Refills: 0 | Status: DISCONTINUED | OUTPATIENT
Start: 2018-10-25 | End: 2018-10-25

## 2018-10-25 RX ORDER — SODIUM CHLORIDE 9 MG/ML
1000 INJECTION INTRAMUSCULAR; INTRAVENOUS; SUBCUTANEOUS
Qty: 0 | Refills: 0 | Status: DISCONTINUED | OUTPATIENT
Start: 2018-10-25 | End: 2018-10-26

## 2018-10-25 RX ADMIN — Medication 100 MILLIGRAM(S): at 14:39

## 2018-10-25 RX ADMIN — Medication 600 MILLIGRAM(S): at 14:38

## 2018-10-25 RX ADMIN — Medication 650 MILLIGRAM(S): at 15:10

## 2018-10-25 RX ADMIN — CEFTRIAXONE 100 GRAM(S): 500 INJECTION, POWDER, FOR SOLUTION INTRAMUSCULAR; INTRAVENOUS at 14:37

## 2018-10-25 RX ADMIN — Medication 600 MILLIGRAM(S): at 14:40

## 2018-10-25 RX ADMIN — MORPHINE SULFATE 2 MILLIGRAM(S): 50 CAPSULE, EXTENDED RELEASE ORAL at 15:34

## 2018-10-25 RX ADMIN — Medication 100 MILLIGRAM(S): at 06:14

## 2018-10-25 NOTE — PROGRESS NOTE ADULT - ASSESSMENT
Impression:  PNA / complicated parapneumonic effusion loculated ?? lung abscess   s/p pig tail     Plan :    keep pig tail to water seal   possible repeat ct scan in 24-48 hrs   follow cx, fluid LDH   continue ABX   follow ID   if any spike in temp or increase wbc consider adding anaerobic coverage   follow lytes   case discussed with Ct surgery and patient   will follow

## 2018-10-25 NOTE — PROGRESS NOTE ADULT - SUBJECTIVE AND OBJECTIVE BOX
Patient is a 33y old  Male who presents with a chief complaint of SOB on exertion, chest pain and cough. (25 Oct 2018 10:13)      INTERVAL HISTORY/overnight event s/p pig tail placed yesterday cxr improved no more fever  exudate effusion   s/p TPA in the pig tail today         Vital Signs Last 24 Hrs  T(C): 37.3 (25 Oct 2018 11:50), Max: 37.4 (25 Oct 2018 07:46)  T(F): 99.2 (25 Oct 2018 11:50), Max: 99.4 (25 Oct 2018 07:46)  HR: 98 (25 Oct 2018 11:50) (89 - 104)  BP: 118/80 (25 Oct 2018 11:50) (118/80 - 132/91)  BP(mean): --  RR: 17 (25 Oct 2018 11:50) (17 - 18)  SpO2: --  Daily     Daily   I&O's Summary    24 Oct 2018 07:01  -  25 Oct 2018 07:00  --------------------------------------------------------  IN: 0 mL / OUT: 71 mL / NET: -71 mL    25 Oct 2018 07:01  -  25 Oct 2018 15:40  --------------------------------------------------------  IN: 600 mL / OUT: 0 mL / NET: 600 mL        Physical Examination:    General: No acute distress.  Alert, oriented, interactive, nonfocal    HEENT: Pupils equal, reactive to light.  Symmetric.    PULM: decrease RLL     CVS: Regular rate and rhythm, no murmurs, rubs, or gallops    ABD: Soft, nondistended, nontender, normoactive bowel sounds, no masses    EXT: No edema, nontender    SKIN: Warm and well perfused, no rashes noted.    Neurology: no motor deficit     Musculoskeletal : Move all extremity         Lab Results:                        11.9   8.80  )-----------( 302      ( 25 Oct 2018 01:26 )             37.5     25 Oct 2018 01:26    139    |  99     |  10     ----------------------------<  105    4.5     |  26     |  0.8      Ca    8.9        25 Oct 2018 01:26  Phos  3.8       25 Oct 2018 01:26  Mg     2.4       25 Oct 2018 01:26    TPro  7.1    /  Alb  3.3    /  TBili  0.3    /  DBili  <0.2   /  AST  82     /  ALT  176    /  AlkPhos  309    25 Oct 2018 01:26  Amylase x     lipase x        PT/INR - ( 25 Oct 2018 01:26 )   PT: 16.10 sec;   INR: 1.40 ratio         PTT - ( 25 Oct 2018 01:26 )  PTT:36.0 sec          Microbiology    Culture - Body Fluid with Gram Stain (collected 24 Oct 2018 13:53)  Source: .Body Fluid None  Gram Stain (24 Oct 2018 23:28):    polymorphonuclear leukocytes seen    No organisms seen    by cytocentrifuge        Medication:  MEDICATIONS  (STANDING):  alteplase  Injectable for Pleural Effusion 10 milliGRAM(s) IntraPleural. once  benzonatate 100 milliGRAM(s) Oral every 8 hours  cefTRIAXone   IVPB 2 Gram(s) IV Intermittent every 24 hours  heparin  Injectable 5000 Unit(s) SubCutaneous every 8 hours  levoFLOXacin IVPB 750 milliGRAM(s) IV Intermittent every 24 hours    MEDICATIONS  (PRN):  acetaminophen   Tablet .. 650 milliGRAM(s) Oral every 6 hours PRN Temp greater or equal to 38C (100.4F)  ALBUTerol   0.042% 1.25 milliGRAM(s) Nebulizer every 4 hours PRN Shortness of Breath  guaiFENesin    Syrup 200 milliGRAM(s) Oral every 6 hours PRN Cough  ibuprofen  Tablet. 600 milliGRAM(s) Oral every 6 hours PRN Mild Pain (1 - 3)        IMAGING STUDIES:  pig tail in place   decrease right effusion or opacity

## 2018-10-25 NOTE — PROGRESS NOTE ADULT - SUBJECTIVE AND OBJECTIVE BOX
GENERAL SURGERY PROGRESS NOTE     KOURTNEY VEE  33y  Male  Hospital day :5d  Rt sided pigtail to suction   OVERNIGHT EVENTS:  No acute overngiht events, patient is doing well, white cell count trending down.  T(F): 99.4 (10-25-18 @ 07:46), Max: 99.4 (10-25-18 @ 07:46)  HR: 104 (10-25-18 @ 07:46) (89 - 107)  BP: 128/82 (10-25-18 @ 07:46) (123/82 - 132/91)  ABP: --  ABP(mean): --  RR: 18 (10-25-18 @ 07:46) (18 - 18)  SpO2: --       GI proph:    AC/ proph: heparin  Injectable 5000 Unit(s) SubCutaneous every 8 hours    ABx: cefTRIAXone   IVPB 2 Gram(s) IV Intermittent every 24 hours  levoFLOXacin IVPB 750 milliGRAM(s) IV Intermittent every 24 hours        LABS  Labs:  CAPILLARY BLOOD GLUCOSE                              11.9   8.80  )-----------( 302      ( 25 Oct 2018 01:26 )             37.5       Auto Neutrophil %: 60.9 % (10-25-18 @ 01:26)  Auto Immature Granulocyte %: 1.4 % (10-25-18 @ 01:26)    10-25    139  |  99  |  10  ----------------------------<  105<H>  4.5   |  26  |  0.8      Calcium, Total Serum: 8.9 mg/dL (10-25-18 @ 01:26)      LFTs:             7.1  | 0.3  | 82       ------------------[309     ( 25 Oct 2018 01:26 )  3.3  | <0.2 | 176         Lipase:x      Amylase:x             Coags:     16.10  ----< 1.40    ( 25 Oct 2018 01:26 )     36.0                Culture - Body Fluid with Gram Stain (collected 24 Oct 2018 13:53)  Source: .Body Fluid None  Gram Stain (24 Oct 2018 23:28):    polymorphonuclear leukocytes seen    No organisms seen    by cytocentrifuge            Plan:  1.TPA injection through the pigtail   2.fu cxr  3.encourage ambulation

## 2018-10-26 LAB
ANION GAP SERPL CALC-SCNC: 16 MMOL/L — HIGH (ref 7–14)
BASOPHILS # BLD AUTO: 0.05 K/UL — SIGNIFICANT CHANGE UP (ref 0–0.2)
BASOPHILS NFR BLD AUTO: 0.4 % — SIGNIFICANT CHANGE UP (ref 0–1)
BUN SERPL-MCNC: 12 MG/DL — SIGNIFICANT CHANGE UP (ref 10–20)
CALCIUM SERPL-MCNC: 8.8 MG/DL — SIGNIFICANT CHANGE UP (ref 8.5–10.1)
CHLORIDE SERPL-SCNC: 96 MMOL/L — LOW (ref 98–110)
CO2 SERPL-SCNC: 24 MMOL/L — SIGNIFICANT CHANGE UP (ref 17–32)
CREAT SERPL-MCNC: 0.9 MG/DL — SIGNIFICANT CHANGE UP (ref 0.7–1.5)
EOSINOPHIL # BLD AUTO: 0.11 K/UL — SIGNIFICANT CHANGE UP (ref 0–0.7)
EOSINOPHIL NFR BLD AUTO: 0.9 % — SIGNIFICANT CHANGE UP (ref 0–8)
GLUCOSE SERPL-MCNC: 148 MG/DL — HIGH (ref 70–99)
HCT VFR BLD CALC: 40.2 % — LOW (ref 42–52)
HGB BLD-MCNC: 12.8 G/DL — LOW (ref 14–18)
IMM GRANULOCYTES NFR BLD AUTO: 1.5 % — HIGH (ref 0.1–0.3)
LYMPHOCYTES # BLD AUTO: 19.4 % — LOW (ref 20.5–51.1)
LYMPHOCYTES # BLD AUTO: 2.43 K/UL — SIGNIFICANT CHANGE UP (ref 1.2–3.4)
MAGNESIUM SERPL-MCNC: 2.4 MG/DL — SIGNIFICANT CHANGE UP (ref 1.8–2.4)
MCHC RBC-ENTMCNC: 25.8 PG — LOW (ref 27–31)
MCHC RBC-ENTMCNC: 31.8 G/DL — LOW (ref 32–37)
MCV RBC AUTO: 81 FL — SIGNIFICANT CHANGE UP (ref 80–94)
MONOCYTES # BLD AUTO: 1.08 K/UL — HIGH (ref 0.1–0.6)
MONOCYTES NFR BLD AUTO: 8.6 % — SIGNIFICANT CHANGE UP (ref 1.7–9.3)
NEUTROPHILS # BLD AUTO: 8.69 K/UL — HIGH (ref 1.4–6.5)
NEUTROPHILS NFR BLD AUTO: 69.2 % — SIGNIFICANT CHANGE UP (ref 42.2–75.2)
NON-GYNECOLOGICAL CYTOLOGY STUDY: SIGNIFICANT CHANGE UP
NRBC # BLD: 0 /100 WBCS — SIGNIFICANT CHANGE UP (ref 0–0)
PHOSPHATE SERPL-MCNC: 3.5 MG/DL — SIGNIFICANT CHANGE UP (ref 2.1–4.9)
PLATELET # BLD AUTO: 407 K/UL — HIGH (ref 130–400)
POTASSIUM SERPL-MCNC: 4.1 MMOL/L — SIGNIFICANT CHANGE UP (ref 3.5–5)
POTASSIUM SERPL-SCNC: 4.1 MMOL/L — SIGNIFICANT CHANGE UP (ref 3.5–5)
RBC # BLD: 4.96 M/UL — SIGNIFICANT CHANGE UP (ref 4.7–6.1)
RBC # FLD: 13.3 % — SIGNIFICANT CHANGE UP (ref 11.5–14.5)
SODIUM SERPL-SCNC: 136 MMOL/L — SIGNIFICANT CHANGE UP (ref 135–146)
WBC # BLD: 12.55 K/UL — HIGH (ref 4.8–10.8)
WBC # FLD AUTO: 12.55 K/UL — HIGH (ref 4.8–10.8)

## 2018-10-26 RX ORDER — SODIUM CHLORIDE 9 MG/ML
1000 INJECTION INTRAMUSCULAR; INTRAVENOUS; SUBCUTANEOUS
Qty: 0 | Refills: 0 | Status: DISCONTINUED | OUTPATIENT
Start: 2018-10-26 | End: 2018-10-26

## 2018-10-26 RX ADMIN — Medication 100 MILLIGRAM(S): at 05:53

## 2018-10-26 RX ADMIN — CEFTRIAXONE 100 GRAM(S): 500 INJECTION, POWDER, FOR SOLUTION INTRAMUSCULAR; INTRAVENOUS at 13:22

## 2018-10-26 RX ADMIN — SODIUM CHLORIDE 100 MILLILITER(S): 9 INJECTION INTRAMUSCULAR; INTRAVENOUS; SUBCUTANEOUS at 05:52

## 2018-10-26 NOTE — DIETITIAN INITIAL EVALUATION ADULT. - PT NOT SOURCE
LOS- pt is alert and oriented. Girlfriend at bedside. No edema. Surgical incision. LBM 10/25. No oral issue/other (specify)

## 2018-10-26 NOTE — PROGRESS NOTE ADULT - SUBJECTIVE AND OBJECTIVE BOX
Progress Note: General Surgery  Patient: KOURTNEY VEE , 33y (1985)Male   MRN: [id number]  Location: [address]  Visit: 10-20-18 Inpatient  Date: 10-26-18 @ 05:39    Procedure/Diagnosis: R pleural effusion s/p pigtail    Events/ 24h: TPA flushed into chest tube yesterday; outputs increased. No acute events overnight. Pain controlled.    Vitals: T(F): 98 (10-26-18 @ 03:34), Max: 99.6 (10-25-18 @ 16:00)  HR: 105 (10-26-18 @ 03:34)  BP: 120/71 (10-26-18 @ 03:34) (90/57 - 128/82)  RR: 18 (10-26-18 @ 03:34)  SpO2: --    In:   10-24-18 @ 07:01  -  10-25-18 @ 07:00  --------------------------------------------------------  IN: 0 mL    10-25-18 @ 07:01  -  10-26-18 @ 05:39  --------------------------------------------------------  IN: 600 mL      Out:   10-24-18 @ 07:01  -  10-25-18 @ 07:00  --------------------------------------------------------  OUT:    Chest Tube: 70 mL    Voided: 1 mL  Total OUT: 71 mL      10-25-18 @ 07:01  -  10-26-18 @ 05:39  --------------------------------------------------------  OUT:    Chest Tube: 520 mL  Total OUT: 520 mL        Net:   10-24-18 @ 07:01  -  10-25-18 @ 07:00  --------------------------------------------------------  NET: -71 mL    10-25-18 @ 07:01  -  10-26-18 @ 05:39  --------------------------------------------------------  NET: 80 mL        Diet: Diet, Regular (10-20-18 @ 17:55)  Diet, NPO after Midnight:      NPO Start Date: 25-Oct-2018,   NPO Start Time: 23:59 (10-25-18 @ 15:53)    IV Fluids: sodium chloride 0.9%. 1000 milliLiter(s) (100 mL/Hr) IV Continuous <Continuous>      Physical Examination:  General Appearance: NAD  HEENT: EOMI, sclera non-icteric.  Heart: RRR   Lungs: CTA on Left, decreased breath sounds on R. R pigtail in place  Abdomen:  Soft, nontender, nondistended. No rigidity, guarding, or rebound tenderness.   MSK/Extremities: Warm & well-perfused. Peripheral pulses intact.  Skin: Warm, dry. No jaundice.       Medications: [Standing]  alteplase  Injectable for Pleural Effusion 10 milliGRAM(s) IntraPleural. once  benzonatate 100 milliGRAM(s) Oral every 8 hours  cefTRIAXone   IVPB 2 Gram(s) IV Intermittent every 24 hours  heparin  Injectable 5000 Unit(s) SubCutaneous every 8 hours  levoFLOXacin IVPB 750 milliGRAM(s) IV Intermittent every 24 hours  sodium chloride 0.9%. 1000 milliLiter(s) (100 mL/Hr) IV Continuous <Continuous>    DVT Prophylaxis: heparin  Injectable 5000 Unit(s) SubCutaneous every 8 hours    GI Prophylaxis:   Antibiotics: cefTRIAXone   IVPB 2 Gram(s) IV Intermittent every 24 hours  levoFLOXacin IVPB 750 milliGRAM(s) IV Intermittent every 24 hours    Anticoagulation:   Medications:[PRN]  acetaminophen   Tablet .. 650 milliGRAM(s) Oral every 6 hours PRN  ALBUTerol   0.042% 1.25 milliGRAM(s) Nebulizer every 4 hours PRN  guaiFENesin    Syrup 200 milliGRAM(s) Oral every 6 hours PRN  ibuprofen  Tablet. 600 milliGRAM(s) Oral every 6 hours PRN      Labs:                        12.8   12.55 )-----------( 407      ( 26 Oct 2018 00:47 )             40.2     10-26    136  |  96<L>  |  12  ----------------------------<  148<H>  4.1   |  24  |  0.9    Ca    8.8      26 Oct 2018 00:47  Phos  3.5     10-26  Mg     2.4     10-26    TPro  7.1  /  Alb  3.3<L>  /  TBili  0.3  /  DBili  <0.2  /  AST  82<H>  /  ALT  176<H>  /  AlkPhos  309<H>  10-25    LIVER FUNCTIONS - ( 25 Oct 2018 01:26 )  Alb: 3.3 g/dL / Pro: 7.1 g/dL / ALK PHOS: 309 U/L / ALT: 176 U/L / AST: 82 U/L / GGT: x           PT/INR - ( 25 Oct 2018 01:26 )   PT: 16.10 sec;   INR: 1.40 ratio         PTT - ( 25 Oct 2018 01:26 )  PTT:36.0 sec        Urine/Micro:    Culture - Fungal, Body Fluid (collected 24 Oct 2018 13:53)  Source: .Body Fluid None  Preliminary Report (26 Oct 2018 05:34):    Testing in progress    Culture - Acid Fast - Body Fluid w/Smear (collected 24 Oct 2018 13:53)  Source: .Body Fluid None    Culture - Body Fluid with Gram Stain (collected 24 Oct 2018 13:53)  Source: .Body Fluid None  Gram Stain (24 Oct 2018 23:28):    polymorphonuclear leukocytes seen    No organisms seen    by cytocentrifuge  Preliminary Report (25 Oct 2018 16:31):    No growth to date.      Imaging:     < from: Xray Chest 1 View- PORTABLE-Routine (10.25.18 @ 07:56) >  Stable right lung opacities and loculated pleural effusion.    Stable right-sided midlung pleural catheter.  IMP    < end of copied text >      Assessment:  33y Male patient admitted S/P     Plan:     out over 24h, continue to follow  CT to suct  f/u AM cxr  yesterday cxr stable  DVT/GI ppx  OOBAT  IS  Pain control    Date/Time: 10-26-18 @ 05:39

## 2018-10-26 NOTE — PROGRESS NOTE ADULT - ASSESSMENT
Transferred from [facility]; s/p pigtail    Tmax 99.6 wbc 12.55    Pt with systolic hypotension,     On cefTRIAXone   IVPB 2 Gram(s) IV Intermittent every 24 hours  levoFLOXacin IVPB 750 milliGRAM(s) IV Intermittent every 24 hours        Mycoplasma IgM negative  RVP negative  AFB stain negative  10/20 B/C x2 negative  10/25 Cxray Stable right lung opacities and loculated pleural effusion.    10/20 CT chest . Small right pleural effusion with right lower lobe atelectasis and   suggestion of posterior right lower lobe 5 cm mass.     PLAN  Repeat wbc  Repeat LFTs  Continue Rocephin/Levo for now Transferred from [facility]; s/p pigtail    Born in [place]  PPD negative 2 years ago    Tmax 99.6 wbc 12.55    Pt with systolic hypotension,     On cefTRIAXone   IVPB 2 Gram(s) IV Intermittent every 24 hours  levoFLOXacin IVPB 750 milliGRAM(s) IV Intermittent every 24 hours        Mycoplasma IgM negative  RVP negative  AFB stain negative  10/20 B/C x2 negative  10/25 Cxray Stable right lung opacities and loculated pleural effusion.    10/20 CT chest . Small right pleural effusion with right lower lobe atelectasis and   suggestion of posterior right lower lobe 5 cm mass.     PLAN  Repeat wbc  Repeat LFTs  Continue Rocephin/Levo for now  PPD

## 2018-10-26 NOTE — PROGRESS NOTE ADULT - SUBJECTIVE AND OBJECTIVE BOX
infectious diseases progress note:  KOURTNEY VEE is a 33yMale patient    PNEUMONIA    Pneumonia of right lung due to Streptococcus pneumoniae, unspecified part of lung      ROS:  CONSTITUTIONAL:  Negative fever or chills, feels well, good appetite  EYES:  Negative  blurry vision or double vision  CARDIOVASCULAR:  Negative for chest pain or palpitations  RESPIRATORY:  Negative for cough, wheezing, or SOB   GASTROINTESTINAL:  Negative for nausea, vomiting, diarrhea, constipation, or abdominal pain  GENITOURINARY:  Negative frequency, urgency or dysuria  NEUROLOGIC:  No headache, confusion, dizziness, lightheadedness    Allergies    No Known Allergies    Intolerances        ANTIBIOTICS/RELEVANT:  antimicrobials  cefTRIAXone   IVPB 2 Gram(s) IV Intermittent every 24 hours  levoFLOXacin IVPB 750 milliGRAM(s) IV Intermittent every 24 hours    immunologic:    OTHER:  acetaminophen   Tablet .. 650 milliGRAM(s) Oral every 6 hours PRN  ALBUTerol   0.042% 1.25 milliGRAM(s) Nebulizer every 4 hours PRN  alteplase  Injectable for Pleural Effusion 10 milliGRAM(s) IntraPleural. once  benzonatate 100 milliGRAM(s) Oral every 8 hours  guaiFENesin    Syrup 200 milliGRAM(s) Oral every 6 hours PRN  heparin  Injectable 5000 Unit(s) SubCutaneous every 8 hours  ibuprofen  Tablet. 600 milliGRAM(s) Oral every 6 hours PRN  sodium chloride 0.9%. 1000 milliLiter(s) IV Continuous <Continuous>      Objective:  T(F): 97.2 (10-26-18 @ 07:36), Max: 99.6 (10-25-18 @ 16:00)  HR: 94 (10-26-18 @ 07:36) (94 - 122)  BP: 132/82 (10-26-18 @ 07:36) (90/57 - 132/82)  RR: 18 (10-26-18 @ 07:36) (15 - 18)  SpO2: 95% (10-26-18 @ 08:00) (95% - 95%)    PHYSICAL EXAM  Constitutional:Well-developed, well nourished  Eyes:TAYLOR, EOMI  Ear/Nose/Throat: no oral lesion, no sinus tenderness on percussion	  Neck:no JVD, no lymphadenopathy, supple  Respiratory: CTA marcy  Cardiovascular: S1S2 RRR, no murmurs  Gastrointestinal:soft, (+) BS, no HSM  Extremities:no e/e/c    10-26    136  |  96<L>  |  12  ----------------------------<  148<H>  4.1   |  24  |  0.9    Mg     2.4     10-26    TPro  7.1  /  Alb  3.3<L>  /  TBili  0.3  /  DBili  <0.2  /  AST  82<H>  /  ALT  176<H>  /  AlkPhos  309<H>  10-25                            12.8   12.55 )-----------( 407      ( 26 Oct 2018 00:47 )             40.2       PT/INR - ( 25 Oct 2018 01:26 )   PT: 16.10 sec;   INR: 1.40 ratio         PTT - ( 25 Oct 2018 01:26 )  PTT:36.0 sec    Culture - Fungal, Body Fluid (collected 24 Oct 2018 13:53)  Source: .Body Fluid None  Preliminary Report (26 Oct 2018 05:34):    Testing in progress    Culture - Acid Fast - Body Fluid w/Smear (collected 24 Oct 2018 13:53)  Source: .Body Fluid None    Culture - Body Fluid with Gram Stain (collected 24 Oct 2018 13:53)  Source: .Body Fluid None  Gram Stain (24 Oct 2018 23:28):    polymorphonuclear leukocytes seen    No organisms seen    by cytocentrifuge  Preliminary Report (25 Oct 2018 16:31):    No growth to date.    Culture - Blood (collected 20 Oct 2018 13:15)  Source: .Blood Blood  Final Report (25 Oct 2018 18:00):    No growth at 5 days.    Culture - Blood (collected 20 Oct 2018 13:15)  Source: .Blood Blood  Final Report (25 Oct 2018 18:00):    No growth at 5 days.

## 2018-10-26 NOTE — DIETITIAN INITIAL EVALUATION ADULT. - PHYSICAL APPEARANCE
BMI is 27.2 (pt admits to losing a lot of weight 1 year ago after having gallbladder removed but gained them back within 1 month)/well nourished

## 2018-10-26 NOTE — DIETITIAN INITIAL EVALUATION ADULT. - OTHER INFO
p/w SOB on exertion, chest pain and cough. started 2 wk ago. s/p pigtail placed yesterday. CXR improved. no more fever exudate effusion. s/p TPA in the pigtail today by pulm. thoracic sx following.

## 2018-10-27 LAB
ALBUMIN SERPL ELPH-MCNC: 3.4 G/DL — LOW (ref 3.5–5.2)
ALP SERPL-CCNC: 244 U/L — HIGH (ref 30–115)
ALT FLD-CCNC: 147 U/L — HIGH (ref 0–41)
ANION GAP SERPL CALC-SCNC: 16 MMOL/L — HIGH (ref 7–14)
AST SERPL-CCNC: 48 U/L — HIGH (ref 0–41)
BASOPHILS # BLD AUTO: 0.04 K/UL — SIGNIFICANT CHANGE UP (ref 0–0.2)
BASOPHILS NFR BLD AUTO: 0.4 % — SIGNIFICANT CHANGE UP (ref 0–1)
BILIRUB DIRECT SERPL-MCNC: <0.2 MG/DL — SIGNIFICANT CHANGE UP (ref 0–0.2)
BILIRUB INDIRECT FLD-MCNC: SIGNIFICANT CHANGE UP MG/DL (ref 0.2–1.2)
BILIRUB SERPL-MCNC: <0.2 MG/DL — SIGNIFICANT CHANGE UP (ref 0.2–1.2)
BUN SERPL-MCNC: 10 MG/DL — SIGNIFICANT CHANGE UP (ref 10–20)
CALCIUM SERPL-MCNC: 9 MG/DL — SIGNIFICANT CHANGE UP (ref 8.5–10.1)
CHLORIDE SERPL-SCNC: 96 MMOL/L — LOW (ref 98–110)
CO2 SERPL-SCNC: 25 MMOL/L — SIGNIFICANT CHANGE UP (ref 17–32)
CREAT SERPL-MCNC: 0.8 MG/DL — SIGNIFICANT CHANGE UP (ref 0.7–1.5)
EOSINOPHIL # BLD AUTO: 0.14 K/UL — SIGNIFICANT CHANGE UP (ref 0–0.7)
EOSINOPHIL NFR BLD AUTO: 1.4 % — SIGNIFICANT CHANGE UP (ref 0–8)
GLUCOSE SERPL-MCNC: 108 MG/DL — HIGH (ref 70–99)
HCT VFR BLD CALC: 38.6 % — LOW (ref 42–52)
HGB BLD-MCNC: 12.5 G/DL — LOW (ref 14–18)
IMM GRANULOCYTES NFR BLD AUTO: 1.5 % — HIGH (ref 0.1–0.3)
LYMPHOCYTES # BLD AUTO: 2.55 K/UL — SIGNIFICANT CHANGE UP (ref 1.2–3.4)
LYMPHOCYTES # BLD AUTO: 25.3 % — SIGNIFICANT CHANGE UP (ref 20.5–51.1)
MAGNESIUM SERPL-MCNC: 2.4 MG/DL — SIGNIFICANT CHANGE UP (ref 1.8–2.4)
MCHC RBC-ENTMCNC: 26.2 PG — LOW (ref 27–31)
MCHC RBC-ENTMCNC: 32.4 G/DL — SIGNIFICANT CHANGE UP (ref 32–37)
MCV RBC AUTO: 80.9 FL — SIGNIFICANT CHANGE UP (ref 80–94)
MONOCYTES # BLD AUTO: 0.81 K/UL — HIGH (ref 0.1–0.6)
MONOCYTES NFR BLD AUTO: 8.1 % — SIGNIFICANT CHANGE UP (ref 1.7–9.3)
NEUTROPHILS # BLD AUTO: 6.37 K/UL — SIGNIFICANT CHANGE UP (ref 1.4–6.5)
NEUTROPHILS NFR BLD AUTO: 63.3 % — SIGNIFICANT CHANGE UP (ref 42.2–75.2)
NRBC # BLD: 0 /100 WBCS — SIGNIFICANT CHANGE UP (ref 0–0)
PHOSPHATE SERPL-MCNC: 3.6 MG/DL — SIGNIFICANT CHANGE UP (ref 2.1–4.9)
PLATELET # BLD AUTO: 437 K/UL — HIGH (ref 130–400)
POTASSIUM SERPL-MCNC: 4.7 MMOL/L — SIGNIFICANT CHANGE UP (ref 3.5–5)
POTASSIUM SERPL-SCNC: 4.7 MMOL/L — SIGNIFICANT CHANGE UP (ref 3.5–5)
PROT SERPL-MCNC: 7 G/DL — SIGNIFICANT CHANGE UP (ref 6–8)
RBC # BLD: 4.77 M/UL — SIGNIFICANT CHANGE UP (ref 4.7–6.1)
RBC # FLD: 13.3 % — SIGNIFICANT CHANGE UP (ref 11.5–14.5)
SODIUM SERPL-SCNC: 137 MMOL/L — SIGNIFICANT CHANGE UP (ref 135–146)
WBC # BLD: 10.06 K/UL — SIGNIFICANT CHANGE UP (ref 4.8–10.8)
WBC # FLD AUTO: 10.06 K/UL — SIGNIFICANT CHANGE UP (ref 4.8–10.8)

## 2018-10-27 RX ADMIN — CEFTRIAXONE 100 GRAM(S): 500 INJECTION, POWDER, FOR SOLUTION INTRAMUSCULAR; INTRAVENOUS at 13:43

## 2018-10-27 NOTE — PROGRESS NOTE ADULT - ASSESSMENT
Assessment:  33y Male patient admitted for R pleural effusion s/p pigtail    Plan:  CT to suct  f/u AM cxr  DVT/GI ppx. encourage ambulation  education for hsq  IS  Pain control  f/u QuantiFeron  f/u cultures  lfts improving

## 2018-10-27 NOTE — PROGRESS NOTE ADULT - SUBJECTIVE AND OBJECTIVE BOX
KOURTNEY VEE  33y Male   [id number]    Hospital Day:   Post Operative Day:    Procedure/dx: R pleural effusion s/p pigtail    Events of the Last 24h: patient is refusing hsq despite education. patient is ambulating    Patient is a 33y old  Male who presents with a chief complaint of SOB on exertion, chest pain and cough. (26 Oct 2018 09:09)    PAST MEDICAL & SURGICAL HISTORY:  No pertinent past medical history  S/P cholecystectomy    Vital Signs Last 24 Hrs  T(C): 37.1 (26 Oct 2018 23:30), Max: 37.1 (26 Oct 2018 23:30)  T(F): 98.8 (26 Oct 2018 23:30), Max: 98.8 (26 Oct 2018 23:30)  HR: 125 (26 Oct 2018 23:30) (94 - 125)  BP: 134/73 (26 Oct 2018 23:30) (123/81 - 134/73)  BP(mean): --  RR: 18 (26 Oct 2018 23:30) (18 - 18)  SpO2: 95% (26 Oct 2018 08:00) (95% - 95%)  Diet, Regular (10-26-18 @ 08:33)    I&O's Detail    25 Oct 2018 07:01  -  26 Oct 2018 07:00  --------------------------------------------------------  IN:    Oral Fluid: 600 mL  Total IN: 600 mL    OUT:    Chest Tube: 520 mL  Total OUT: 520 mL    Total NET: 80 mL    MEDICATIONS  (STANDING):  alteplase  Injectable for Pleural Effusion 10 milliGRAM(s) IntraPleural. once  benzonatate 100 milliGRAM(s) Oral every 8 hours  cefTRIAXone   IVPB 2 Gram(s) IV Intermittent every 24 hours  heparin  Injectable 5000 Unit(s) SubCutaneous every 8 hours  levoFLOXacin IVPB 750 milliGRAM(s) IV Intermittent every 24 hours    MEDICATIONS  (PRN):  acetaminophen   Tablet .. 650 milliGRAM(s) Oral every 6 hours PRN Temp greater or equal to 38C (100.4F)  ALBUTerol   0.042% 1.25 milliGRAM(s) Nebulizer every 4 hours PRN Shortness of Breath  guaiFENesin    Syrup 200 milliGRAM(s) Oral every 6 hours PRN Cough  ibuprofen  Tablet. 600 milliGRAM(s) Oral every 6 hours PRN Mild Pain (1 - 3)    PHYSICAL EXAM:  GENERAL: NAD  EYES: conjunctiva and sclera clear  CHEST/LUNG: Clear to auscultation bilaterally; No wheezing, ronchi, rales  HEART: Regular rate and rhythm;   ABDOMEN: Soft, Nontender, Nondistended; Bowel sounds present  EXTREMITIES: No clubbing, cyanosis, or edema  PSYCH: AAOx3           LABS:                  12.5   10.06 )-----------( 437      ( 27 Oct 2018 01:40 )             38.6        10-27    137  |  96<L>  |  10  ----------------------------<  108<H>  4.7   |  25  |  0.8    Ca    9.0      27 Oct 2018 01:40  Phos  3.6     10-27  Mg     2.4     10-27    TPro  7.0  /  Alb  3.4<L>  /  TBili  <0.2  /  DBili  <0.2  /  AST  48<H>  /  ALT  147<H>  /  AlkPhos  244<H>  10-27    LIVER FUNCTIONS - ( 27 Oct 2018 01:40 )  Alb: 3.4 g/dL / Pro: 7.0 g/dL / ALK PHOS: 244 U/L / ALT: 147 U/L / AST: 48 U/L / GGT: x           Culture - Fungal, Body Fluid (collected 24 Oct 2018 13:53)  Source: .Body Fluid None  Preliminary Report (26 Oct 2018 05:34):    Testing in progress    Culture - Acid Fast - Body Fluid w/Smear (collected 24 Oct 2018 13:53)  Source: .Body Fluid None    Culture - Body Fluid with Gram Stain (collected 24 Oct 2018 13:53)  Source: .Body Fluid None  Gram Stain (24 Oct 2018 23:28):    polymorphonuclear leukocytes seen    No organisms seen    by cytocentrifuge  Preliminary Report (25 Oct 2018 16:31):    No growth to date.    IMAGING:  < from: Xray Chest 1 View- PORTABLE-Routine (10.26.18 @ 06:38) >  Impression:      Decreased right lung opacities and loculated pleural effusion.    < end of copied text >

## 2018-10-28 LAB
ANION GAP SERPL CALC-SCNC: 13 MMOL/L — SIGNIFICANT CHANGE UP (ref 7–14)
BASOPHILS # BLD AUTO: 0.05 K/UL — SIGNIFICANT CHANGE UP (ref 0–0.2)
BASOPHILS NFR BLD AUTO: 0.5 % — SIGNIFICANT CHANGE UP (ref 0–1)
BUN SERPL-MCNC: 13 MG/DL — SIGNIFICANT CHANGE UP (ref 10–20)
CALCIUM SERPL-MCNC: 9.4 MG/DL — SIGNIFICANT CHANGE UP (ref 8.5–10.1)
CHLORIDE SERPL-SCNC: 93 MMOL/L — LOW (ref 98–110)
CO2 SERPL-SCNC: 29 MMOL/L — SIGNIFICANT CHANGE UP (ref 17–32)
CREAT SERPL-MCNC: 1 MG/DL — SIGNIFICANT CHANGE UP (ref 0.7–1.5)
EOSINOPHIL # BLD AUTO: 0.12 K/UL — SIGNIFICANT CHANGE UP (ref 0–0.7)
EOSINOPHIL NFR BLD AUTO: 1.3 % — SIGNIFICANT CHANGE UP (ref 0–8)
GLUCOSE SERPL-MCNC: 109 MG/DL — HIGH (ref 70–99)
HCT VFR BLD CALC: 41.1 % — LOW (ref 42–52)
HGB BLD-MCNC: 13.2 G/DL — LOW (ref 14–18)
IMM GRANULOCYTES NFR BLD AUTO: 1.6 % — HIGH (ref 0.1–0.3)
LYMPHOCYTES # BLD AUTO: 2.63 K/UL — SIGNIFICANT CHANGE UP (ref 1.2–3.4)
LYMPHOCYTES # BLD AUTO: 28.1 % — SIGNIFICANT CHANGE UP (ref 20.5–51.1)
MAGNESIUM SERPL-MCNC: 2.5 MG/DL — HIGH (ref 1.8–2.4)
MCHC RBC-ENTMCNC: 26.1 PG — LOW (ref 27–31)
MCHC RBC-ENTMCNC: 32.1 G/DL — SIGNIFICANT CHANGE UP (ref 32–37)
MCV RBC AUTO: 81.4 FL — SIGNIFICANT CHANGE UP (ref 80–94)
MONOCYTES # BLD AUTO: 0.89 K/UL — HIGH (ref 0.1–0.6)
MONOCYTES NFR BLD AUTO: 9.5 % — HIGH (ref 1.7–9.3)
NEUTROPHILS # BLD AUTO: 5.51 K/UL — SIGNIFICANT CHANGE UP (ref 1.4–6.5)
NEUTROPHILS NFR BLD AUTO: 59 % — SIGNIFICANT CHANGE UP (ref 42.2–75.2)
NRBC # BLD: 0 /100 WBCS — SIGNIFICANT CHANGE UP (ref 0–0)
PHOSPHATE SERPL-MCNC: 4.1 MG/DL — SIGNIFICANT CHANGE UP (ref 2.1–4.9)
PLATELET # BLD AUTO: 428 K/UL — HIGH (ref 130–400)
POTASSIUM SERPL-MCNC: 4.6 MMOL/L — SIGNIFICANT CHANGE UP (ref 3.5–5)
POTASSIUM SERPL-SCNC: 4.6 MMOL/L — SIGNIFICANT CHANGE UP (ref 3.5–5)
RBC # BLD: 5.05 M/UL — SIGNIFICANT CHANGE UP (ref 4.7–6.1)
RBC # FLD: 13.3 % — SIGNIFICANT CHANGE UP (ref 11.5–14.5)
SODIUM SERPL-SCNC: 135 MMOL/L — SIGNIFICANT CHANGE UP (ref 135–146)
WBC # BLD: 9.35 K/UL — SIGNIFICANT CHANGE UP (ref 4.8–10.8)
WBC # FLD AUTO: 9.35 K/UL — SIGNIFICANT CHANGE UP (ref 4.8–10.8)

## 2018-10-28 RX ADMIN — CEFTRIAXONE 100 GRAM(S): 500 INJECTION, POWDER, FOR SOLUTION INTRAMUSCULAR; INTRAVENOUS at 13:49

## 2018-10-28 NOTE — PROGRESS NOTE ADULT - SUBJECTIVE AND OBJECTIVE BOX
GENERAL SURGERY PROGRESS NOTE     KOURTNEY VEE  33y  Male  Hospital day :8d  POD:  Procedure:   OVERNIGHT EVENTS:    T(F): 99.9 (10-28-18 @ 00:09), Max: 99.9 (10-28-18 @ 00:09)  HR: 108 (10-28-18 @ 00:09) (100 - 131)  BP: 104/60 (10-28-18 @ 00:09) (104/60 - 129/95)  ABP: --  ABP(mean): --  RR: 18 (10-28-18 @ 00:09) (18 - 18)  SpO2: 95% (10-27-18 @ 08:14) (95% - 95%)    DIET/FLUIDS:   NG:                                                                                DRAINS:     BM:     EMESIS:     URINE:      GI proph:    AC/ proph: heparin  Injectable 5000 Unit(s) SubCutaneous every 8 hours    ABx: cefTRIAXone   IVPB 2 Gram(s) IV Intermittent every 24 hours  levoFLOXacin IVPB 750 milliGRAM(s) IV Intermittent every 24 hours      PHYSICAL EXAM:  GENERAL: NAD, well-appearing  CHEST/LUNG: Clear to auscultation bilaterally  HEART: Regular rate and rhythm  ABDOMEN: Soft, Nontender, Nondistended;   EXTREMITIES:  No clubbing, cyanosis, or edema      LABS  Labs:  CAPILLARY BLOOD GLUCOSE                              13.2   9.35  )-----------( 428      ( 28 Oct 2018 02:18 )             41.1       Auto Neutrophil %: 59.0 % (10-28-18 @ 02:18)  Auto Immature Granulocyte %: 1.6 % (10-28-18 @ 02:18)    10-28    135  |  93<L>  |  13  ----------------------------<  109<H>  4.6   |  29  |  1.0      Calcium, Total Serum: 9.4 mg/dL (10-28-18 @ 02:18)      LFTs:             7.0  | <0.2 | 48       ------------------[244     ( 27 Oct 2018 01:40 )  3.4  | <0.2 | 147         Lipase:x      Amylase:x             Plan:  1.FU am CXR   2.Chest tube to suction   3.Encourage ambulation  4.Encourage incentive spirometry   5.FU pleural fluid cultures   6.FU interferon test GENERAL SURGERY PROGRESS NOTE     KOURTNEY VEE  33y  Male  Hospital day :8d  POD:  Procedure:   OVERNIGHT EVENTS:    T(F): 99.9 (10-28-18 @ 00:09), Max: 99.9 (10-28-18 @ 00:09)  HR: 108 (10-28-18 @ 00:09) (100 - 131)  BP: 104/60 (10-28-18 @ 00:09) (104/60 - 129/95)  ABP: --  ABP(mean): --  RR: 18 (10-28-18 @ 00:09) (18 - 18)  SpO2: 95% (10-27-18 @ 08:14) (95% - 95%     GI proph:    AC/ proph: heparin  Injectable 5000 Unit(s) SubCutaneous every 8 hours    ABx: cefTRIAXone   IVPB 2 Gram(s) IV Intermittent every 24 hours  levoFLOXacin IVPB 750 milliGRAM(s) IV Intermittent every 24 hours      PHYSICAL EXAM:  GENERAL: NAD, well-appearing  CHEST/LUNG: Clear to auscultation bilaterally  HEART: Regular rate and rhythm  ABDOMEN: Soft, Nontender, Nondistended;   EXTREMITIES:  No clubbing, cyanosis, or edema      LABS  Labs:  CAPILLARY BLOOD GLUCOSE                              13.2   9.35  )-----------( 428      ( 28 Oct 2018 02:18 )             41.1       Auto Neutrophil %: 59.0 % (10-28-18 @ 02:18)  Auto Immature Granulocyte %: 1.6 % (10-28-18 @ 02:18)    10-28    135  |  93<L>  |  13  ----------------------------<  109<H>  4.6   |  29  |  1.0      Calcium, Total Serum: 9.4 mg/dL (10-28-18 @ 02:18)      LFTs:             7.0  | <0.2 | 48       ------------------[244     ( 27 Oct 2018 01:40 )  3.4  | <0.2 | 147         Lipase:x      Amylase:x             Plan:  1.FU am CXR   2.Chest tube to suction   3.Encourage ambulation  4.Encourage incentive spirometry   5.FU pleural fluid cultures   6.FU interferon test

## 2018-10-28 NOTE — PROGRESS NOTE ADULT - ATTENDING COMMENTS
33y Male patient admitted for R pleural effusion s/p pigtail  s/p TPA lysis through the tube 2 days ago  CXR is gradually improving  cont CT to suction  daily CXRs  DVT/GI ppx. encourage ambulation  IS  Pain control  f/u QuantiFeron  f/u cultures  lfts improving
33y Male patient admitted for R pleural effusion s/p pigtail  s/p TPA lysis through the tube 3 days ago  CXR is gradually improving  cont CT to suction  consider d/c'ing CT tomorrow  daily CXRs  DVT/GI ppx. encourage ambulation  IS  Pain control  f/u QuantiFeron  f/u cultures  lfts improving

## 2018-10-28 NOTE — PROGRESS NOTE ADULT - SUBJECTIVE AND OBJECTIVE BOX
GENERAL SURGERY PROGRESS NOTE     KOURTNEY VEE  33y  Male  Hospital day :8d  Rt sided pigtail insertion for parapneumonic effusion.  OVERNIGHT EVENTS:     T(F): 99.9 (10-28-18 @ 00:09), Max: 99.9 (10-28-18 @ 00:09)  HR: 108 (10-28-18 @ 00:09) (100 - 131)  BP: 104/60 (10-28-18 @ 00:09) (104/60 - 129/95)  ABP: --  ABP(mean): --  RR: 18 (10-28-18 @ 00:09) (18 - 18)  SpO2: 95% (10-27-18 @ 08:14) (95% - 95%)     GI proph:    AC/ proph: heparin  Injectable 5000 Unit(s) SubCutaneous every 8 hours    ABx: cefTRIAXone   IVPB 2 Gram(s) IV Intermittent every 24 hours  levoFLOXacin IVPB 750 milliGRAM(s) IV Intermittent every 24 hours      PHYSICAL EXAM:  GENERAL: NAD, well-appearing  CHEST/LUNG: Clear to auscultation bilaterally  HEART: Regular rate and rhythm  ABDOMEN: Soft, Nontender, Nondistended;   EXTREMITIES:  No clubbing, cyanosis, or edema      LABS  Labs:  CAPILLARY BLOOD GLUCOSE                              13.2   9.35  )-----------( 428      ( 28 Oct 2018 02:18 )             41.1       Auto Neutrophil %: 59.0 % (10-28-18 @ 02:18)  Auto Immature Granulocyte %: 1.6 % (10-28-18 @ 02:18)    10-28    135  |  93<L>  |  13  ----------------------------<  109<H>  4.6   |  29  |  1.0      Calcium, Total Serum: 9.4 mg/dL (10-28-18 @ 02:18)      LFTs:             7.0  | <0.2 | 48       ------------------[244     ( 27 Oct 2018 01:40 )  3.4  | <0.2 | 147         Lipase:x      Amylase:x             Coags:                RADIOLOGY & ADDITIONAL TESTS:      A/P

## 2018-10-28 NOTE — PROVIDER CONTACT NOTE (OTHER) - ACTION/TREATMENT ORDERED:
Pt refused Benzonatate and Heparin medications this morning. Pt educated on the importance of medications. Pt. voiced understanding however persist of refusing. MD Sosa made aware. Will speak to pt
MD Acevedo aware. No new interventions/orders at this time.

## 2018-10-29 ENCOUNTER — TRANSCRIPTION ENCOUNTER (OUTPATIENT)
Age: 33
End: 2018-10-29

## 2018-10-29 VITALS
RESPIRATION RATE: 18 BRPM | DIASTOLIC BLOOD PRESSURE: 71 MMHG | TEMPERATURE: 98 F | SYSTOLIC BLOOD PRESSURE: 109 MMHG | HEART RATE: 111 BPM

## 2018-10-29 LAB
ANION GAP SERPL CALC-SCNC: 12 MMOL/L — SIGNIFICANT CHANGE UP (ref 7–14)
BUN SERPL-MCNC: 16 MG/DL — SIGNIFICANT CHANGE UP (ref 10–20)
CALCIUM SERPL-MCNC: 9.5 MG/DL — SIGNIFICANT CHANGE UP (ref 8.5–10.1)
CHLORIDE SERPL-SCNC: 93 MMOL/L — LOW (ref 98–110)
CO2 SERPL-SCNC: 28 MMOL/L — SIGNIFICANT CHANGE UP (ref 17–32)
CREAT SERPL-MCNC: 0.8 MG/DL — SIGNIFICANT CHANGE UP (ref 0.7–1.5)
GAMMA INTERFERON BACKGROUND BLD IA-ACNC: 0.02 IU/ML — SIGNIFICANT CHANGE UP
GLUCOSE SERPL-MCNC: 129 MG/DL — HIGH (ref 70–99)
HCT VFR BLD CALC: 42 % — SIGNIFICANT CHANGE UP (ref 42–52)
HGB BLD-MCNC: 13.3 G/DL — LOW (ref 14–18)
M TB IFN-G BLD-IMP: NEGATIVE — SIGNIFICANT CHANGE UP
M TB IFN-G CD4+ BCKGRND COR BLD-ACNC: 0 IU/ML — SIGNIFICANT CHANGE UP
M TB IFN-G CD4+CD8+ BCKGRND COR BLD-ACNC: 0.02 IU/ML — SIGNIFICANT CHANGE UP
MAGNESIUM SERPL-MCNC: 2.2 MG/DL — SIGNIFICANT CHANGE UP (ref 1.8–2.4)
MCHC RBC-ENTMCNC: 25.9 PG — LOW (ref 27–31)
MCHC RBC-ENTMCNC: 31.7 G/DL — LOW (ref 32–37)
MCV RBC AUTO: 81.7 FL — SIGNIFICANT CHANGE UP (ref 80–94)
NRBC # BLD: 0 /100 WBCS — SIGNIFICANT CHANGE UP (ref 0–0)
PHOSPHATE SERPL-MCNC: 4 MG/DL — SIGNIFICANT CHANGE UP (ref 2.1–4.9)
PLATELET # BLD AUTO: 463 K/UL — HIGH (ref 130–400)
POTASSIUM SERPL-MCNC: 4.6 MMOL/L — SIGNIFICANT CHANGE UP (ref 3.5–5)
POTASSIUM SERPL-SCNC: 4.6 MMOL/L — SIGNIFICANT CHANGE UP (ref 3.5–5)
QUANT TB PLUS MITOGEN MINUS NIL: 2.32 IU/ML — SIGNIFICANT CHANGE UP
RBC # BLD: 5.14 M/UL — SIGNIFICANT CHANGE UP (ref 4.7–6.1)
RBC # FLD: 13.1 % — SIGNIFICANT CHANGE UP (ref 11.5–14.5)
SODIUM SERPL-SCNC: 133 MMOL/L — LOW (ref 135–146)
WBC # BLD: 9.05 K/UL — SIGNIFICANT CHANGE UP (ref 4.8–10.8)
WBC # FLD AUTO: 9.05 K/UL — SIGNIFICANT CHANGE UP (ref 4.8–10.8)

## 2018-10-29 RX ORDER — CIPROFLOXACIN LACTATE 400MG/40ML
1 VIAL (ML) INTRAVENOUS
Qty: 10 | Refills: 0 | OUTPATIENT
Start: 2018-10-29 | End: 2018-11-07

## 2018-10-29 RX ORDER — CIPROFLOXACIN LACTATE 400MG/40ML
1 VIAL (ML) INTRAVENOUS
Qty: 10 | Refills: 0
Start: 2018-10-29 | End: 2018-11-07

## 2018-10-29 RX ORDER — ACETAMINOPHEN 500 MG
2 TABLET ORAL
Qty: 0 | Refills: 0 | DISCHARGE
Start: 2018-10-29

## 2018-10-29 RX ADMIN — HEPARIN SODIUM 5000 UNIT(S): 5000 INJECTION INTRAVENOUS; SUBCUTANEOUS at 13:03

## 2018-10-29 RX ADMIN — CEFTRIAXONE 100 GRAM(S): 500 INJECTION, POWDER, FOR SOLUTION INTRAMUSCULAR; INTRAVENOUS at 13:02

## 2018-10-29 NOTE — PROGRESS NOTE ADULT - SUBJECTIVE AND OBJECTIVE BOX
GENERAL SURGERY PROGRESS NOTE     KOURTNEY VEE  33y  Male  Hospital day :9d  OVERNIGHT EVENTS: No acute events overnight, patient doing well, chest tube has no output over 24 hrs, no airleak.    T(F): 98.9 (10-29-18 @ 07:36), Max: 99 (10-28-18 @ 23:30)  HR: 110 (10-29-18 @ 07:36) (110 - 120)  BP: 117/74 (10-29-18 @ 07:36) (108/63 - 125/86)  ABP: --  ABP(mean): --  RR: 18 (10-29-18 @ 07:36) (18 - 18)  SpO2: --       GI proph:    AC/ proph: heparin  Injectable 5000 Unit(s) SubCutaneous every 8 hours    ABx: cefTRIAXone   IVPB 2 Gram(s) IV Intermittent every 24 hours  levoFLOXacin IVPB 750 milliGRAM(s) IV Intermittent every 24 hours      PHYSICAL EXAM:  GENERAL: NAD, well-appearing  CHEST/LUNG: Clear to auscultation bilaterally, chesttube in place  HEART: Regular rate and rhythm  ABDOMEN: Soft, Nontender, Nondistended;   EXTREMITIES:  No clubbing, cyanosis, or edema      LABS  Labs:  CAPILLARY BLOOD GLUCOSE                              13.3   9.05  )-----------( 463      ( 29 Oct 2018 01:04 )             42.0         10-29    133<L>  |  93<L>  |  16  ----------------------------<  129<H>  4.6   |  28  |  0.8      Calcium, Total Serum: 9.5 mg/dL (10-29-18 @ 01:04)    Plan:  1.Remove pigtail today.  2.Chest xray after pigtail removal   3.ID plan for Abx   4.Possible discharge home after ID recommendations for Antibiotics

## 2018-10-29 NOTE — PROGRESS NOTE ADULT - REASON FOR ADMISSION
SOB on exertion, chest pain and cough.

## 2018-10-29 NOTE — DISCHARGE NOTE ADULT - MEDICATION SUMMARY - MEDICATIONS TO TAKE
I will START or STAY ON the medications listed below when I get home from the hospital:    acetaminophen 325 mg oral tablet  -- 2 tab(s) by mouth every 6 hours, As needed, Temp greater or equal to 38C (100.4F)  -- Indication: For Pain    levoFLOXacin 750 mg oral tablet  -- 1 tab(s) by mouth once a day   -- Indication: For antibiotic

## 2018-10-29 NOTE — DISCHARGE NOTE ADULT - ADDITIONAL INSTRUCTIONS
1.Please visit Dr anguiano in the clinic on Friday with chest xray  2.Please followup, Dr Batista- infectious disease

## 2018-10-29 NOTE — PROVIDER CONTACT NOTE (MEDICATION) - SITUATION
Benzonotate and Heparin refused by Pt during PM and AM med pass. No interventions ordered at this time.

## 2018-10-29 NOTE — DISCHARGE NOTE ADULT - PATIENT PORTAL LINK FT
You can access the [facility] Patient Portal, offered by [facility], by registering with the following website: [email or web address]

## 2018-10-29 NOTE — DISCHARGE NOTE ADULT - CARE PLAN
Principal Discharge DX:	Pleural effusion  Goal:	complete resolution  Assessment and plan of treatment:	Patient came with rt sided pleural effusion, fever and cough, pigtail catheter was placed

## 2018-10-29 NOTE — DISCHARGE NOTE ADULT - PLAN OF CARE
complete resolution Patient came with rt sided pleural effusion, fever and cough, pigtail catheter was placed

## 2018-10-29 NOTE — DISCHARGE NOTE ADULT - NS MD DC FALL RISK RISK
Daily Note     Today's date: 2018  Patient name: Chris Oquendo  : 1959  MRN: [id number]  Referring provider: Keely Palma  Dx:   Encounter Diagnosis   Name Primary?  SAH (subarachnoid hemorrhage) ([facility]) Yes                  Subjective: "I made a pie yesterday and remembered how to make the crust from scratch "      Objective: See treatment diary below      Assessment: Tolerated treatment well  Patient arrived with a 3/10 HA after taking tylenol earlier as PERKINS was at a 7/10  5 letter word unscramble with board to table copy and clutter tiles  Required lights to be turned off due to increase in symptoms of blurriness and squinting  Boxed information for visual memory and noted with increased dizziness and required rest break      Plan: Continued skilled OT per POC     INTERVENTION COMMENTS:  Diagnosis: [facility] [I60 9]  Precautions: R rib fx  FOTO:  7 of 8 visits, PN due 
For information on Fall & Injury Prevention, visit [email or web address]

## 2018-10-29 NOTE — DISCHARGE NOTE ADULT - HOSPITAL COURSE
34 yo M with no significant MHx presented with c/o SOB on exertion, chest pain started 2 weeks ago and gotten progressively worse, 1 week ago cough started and last 4 nights pt had subjective fever. He traveled to [place] a month ago, works as a . imaging was done and he was found to have Rt sided pleural effusion. He was transferred from [facility], to [facility], IR drainage of the rt sided pleural effusion was done, pigtail catheter was placed, it was placed to suction, serial chest xray was done, ID was consulted.   Pigtail was removed today, chest xray shows expansion of the lungs and resolution of pleural effusion. Patient seen and examined today, comffortable.

## 2018-10-29 NOTE — DISCHARGE NOTE ADULT - CARE PROVIDER_API CALL
Matthew Mixon), Surgery; Thoracic Surgery  [address] [place] [address]  Phone: [phone]  Fax: [phone]    Ernesto Batista), Infectious Disease; Internal Medicine  [address] [place] [address]  Phone: [phone]  Fax: [phone]

## 2018-11-05 DIAGNOSIS — J18.9 PNEUMONIA, UNSPECIFIED ORGANISM: ICD-10-CM

## 2018-11-05 DIAGNOSIS — R74.0 NONSPECIFIC ELEVATION OF LEVELS OF TRANSAMINASE AND LACTIC ACID DEHYDROGENASE [LDH]: ICD-10-CM

## 2018-11-05 DIAGNOSIS — J13 PNEUMONIA DUE TO STREPTOCOCCUS PNEUMONIAE: ICD-10-CM

## 2018-11-05 DIAGNOSIS — J90 PLEURAL EFFUSION, NOT ELSEWHERE CLASSIFIED: ICD-10-CM

## 2018-11-05 DIAGNOSIS — J98.11 ATELECTASIS: ICD-10-CM

## 2022-11-02 ENCOUNTER — TRANSCRIPTION ENCOUNTER (OUTPATIENT)
Age: 37
End: 2022-11-02

## 2022-11-02 ENCOUNTER — APPOINTMENT (OUTPATIENT)
Dept: GASTROENTEROLOGY | Facility: CLINIC | Age: 37
End: 2022-11-02

## 2022-11-02 VITALS
OXYGEN SATURATION: 98 % | WEIGHT: 178 LBS | HEIGHT: 68 IN | SYSTOLIC BLOOD PRESSURE: 180 MMHG | BODY MASS INDEX: 26.98 KG/M2 | DIASTOLIC BLOOD PRESSURE: 100 MMHG | HEART RATE: 78 BPM

## 2022-11-02 DIAGNOSIS — Z86.39 PERSONAL HISTORY OF OTHER ENDOCRINE, NUTRITIONAL AND METABOLIC DISEASE: ICD-10-CM

## 2022-11-02 DIAGNOSIS — Z87.19 PERSONAL HISTORY OF OTHER DISEASES OF THE DIGESTIVE SYSTEM: ICD-10-CM

## 2022-11-02 DIAGNOSIS — Z78.9 OTHER SPECIFIED HEALTH STATUS: ICD-10-CM

## 2022-11-02 DIAGNOSIS — F17.290 NICOTINE DEPENDENCE, OTHER TOBACCO PRODUCT, UNCOMPLICATED: ICD-10-CM

## 2022-11-02 PROCEDURE — 99205 OFFICE O/P NEW HI 60 MIN: CPT

## 2022-11-02 PROCEDURE — 91200 LIVER ELASTOGRAPHY: CPT

## 2022-11-02 NOTE — HISTORY OF PRESENT ILLNESS
[___] : Performed [unfilled] [Needlestick Exposure] : no needlestick exposure [Infected Sexual Partner] : no infected sexual partner [IV Drug Use] : no IV drug use [Tattoo] : no tattoos [Body Piercing] : no body piercing [Hemodialysis] : no hemodialysis [Transfusion before 1992] : no transfusion before 1992 [Transplant before 1992] : no transplant before 1992 [Alcohol Abuse] : no alcohol abuse [Cocaine Use] : no cocaine use [de-identified] : Reports RUQ pain intermittently for past 2 months. Pain feels like itching inside or pricking pain which lasts few seconds. Occurs several times a day. No increasing or reducing factors. Sometimes pain is in R lumbar area. Has bloating and feels gassy at times but appears related to specific foods. No diarrhea constipation nausea or vomiting. Had cholecystectomy done 2017 for biliary colic / cholecystitis.  Patient had fatty liver for past few years. EGD and US abdomen in 2017 Elevated liver tests on labs in October 2022.  No use of herbal supplements or teas. \par Diabetic since 2022. On metformin. Maximum wt has been 178 lb. Eats healthy but not been able to exercise. Hb a1c was 10 but with carb control and protein diet was able to bring it to 5s. \par Has HLD but not on treatment. Wants to control with diet. \par No prior HTN. 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 Before was drinking only on social occasions - 2 drinks. \par Occasional hookah use\par No drug use\par

## 2022-11-02 NOTE — REVIEW OF SYSTEMS
[Fever] : no fever [Chills] : no chills [Eye Pain] : no eye pain [Earache] : no earache [Loss Of Hearing] : no hearing loss [Chest Pain] : no chest pain [Palpitations] : no palpitations [Cough] : no cough [SOB on Exertion] : no shortness of breath during exertion [Abdominal Pain] : abdominal pain [Vomiting] : no vomiting [Constipation] : no constipation [Diarrhea] : no diarrhea [Heartburn] : no heartburn [Melena] : no melena [Easy Bleeding] : no tendency for easy bleeding [Easy Bruising] : no tendency for easy bruising [Negative] : Endocrine

## 2022-11-02 NOTE — PHYSICAL EXAM
[General Appearance - Alert] : alert [General Appearance - Well Nourished] : well nourished [General Appearance - Well Developed] : well developed [Sclera] : the sclera and conjunctiva were normal [Outer Ear] : the ears and nose were normal in appearance [Hearing Threshold Finger Rub Not [place]] : hearing was normal [Neck Appearance] : the appearance of the neck was normal [Neck Cervical Mass (___cm)] : no neck mass was observed [Jugular Venous Distention Increased] : there was no jugular-venous distention [Thyroid Diffuse Enlargement] : the thyroid was not enlarged [Respiration, Rhythm And Depth] : normal respiratory rhythm and effort [Auscultation Breath Sounds / Voice Sounds] : lungs were clear to auscultation bilaterally [Heart Sounds] : normal S1 and S2 [Murmurs] : no murmurs [Abdomen Soft] : soft [Abdomen Tenderness] : non-tender [Nail Clubbing] : no clubbing  or cyanosis of the fingernails [Skin Color & Pigmentation] : normal skin color and pigmentation [] : no rash [No Focal Deficits] : no focal deficits [Oriented To Time, Place, And Person] : oriented to person, place, and time

## 2022-11-02 NOTE — REASON FOR VISIT
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

## 2022-11-02 NOTE — ASSESSMENT
[FreeTextEntry1] : 38 yo [place] M with T2DM hepatic steatosis seen with elevated liver tests and RUQ pain.\par \par Elevated liver tests\par Elevated liver tests in 2018 when admitted for Strep pneumonia. \par Sept 2022 AST 64  \par Hepatitis A B C HIV negative. Ferritin Iron sat \par   HLD 51 Hba1c 6.4 \par BMI 27 but has metabolic syndrome with T2DM HLD. 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 Will order CLD work up including serologies and US abdomen\par Advised wt loss with healthy eating and exercise. \par Will discuss liver biopsy as ALT > 4 x ULN and advanced fibrosis on fibroscan\par \par RUQ pain\par Dyspeptic symptoms \par Reports normal EGD in 2017. Will repeat\par Rule out H pylori gastritis, celiac disease giardiasis. If negative may need nuclear emptying study. \par \par Health maintenance\par Check hepatitis A and B immune status. \par \par

## 2022-11-07 LAB
ACE BLD-CCNC: 39 U/L
CERULOPLASMIN SERPL-MCNC: 27 MG/DL
DEPRECATED KAPPA LC FREE/LAMBDA SER: 1.06 RATIO
IGA SER QL IEP: 200 MG/DL
IGG SER QL IEP: 1064 MG/DL
IGM SER QL IEP: 144 MG/DL
KAPPA LC CSF-MCNC: 1.11 MG/DL
KAPPA LC SERPL-MCNC: 1.18 MG/DL
MITOCHONDRIA AB SER IF-ACNC: NORMAL
SMOOTH MUSCLE AB SER QL IF: NORMAL
TTG IGA SER IA-ACNC: <1.2 U/ML
TTG IGA SER-ACNC: NEGATIVE

## 2022-11-08 ENCOUNTER — TRANSCRIPTION ENCOUNTER (OUTPATIENT)
Age: 37
End: 2022-11-08

## 2022-11-08 ENCOUNTER — RESULT REVIEW (OUTPATIENT)
Age: 37
End: 2022-11-08

## 2022-11-08 ENCOUNTER — OUTPATIENT (OUTPATIENT)
Dept: OUTPATIENT SERVICES | Facility: HOSPITAL | Age: 37
LOS: 1 days | Discharge: HOME | End: 2022-11-08

## 2022-11-08 VITALS
SYSTOLIC BLOOD PRESSURE: 141 MMHG | DIASTOLIC BLOOD PRESSURE: 92 MMHG | RESPIRATION RATE: 18 BRPM | HEART RATE: 75 BPM | OXYGEN SATURATION: 99 %

## 2022-11-08 VITALS
HEART RATE: 83 BPM | TEMPERATURE: 97 F | SYSTOLIC BLOOD PRESSURE: 147 MMHG | WEIGHT: 177.91 LBS | DIASTOLIC BLOOD PRESSURE: 93 MMHG | RESPIRATION RATE: 18 BRPM | HEIGHT: 68 IN

## 2022-11-08 DIAGNOSIS — Z90.49 ACQUIRED ABSENCE OF OTHER SPECIFIED PARTS OF DIGESTIVE TRACT: Chronic | ICD-10-CM

## 2022-11-08 LAB — ANA SER IF-ACNC: NEGATIVE

## 2022-11-08 PROCEDURE — 88312 SPECIAL STAINS GROUP 1: CPT | Mod: 26

## 2022-11-08 PROCEDURE — 43239 EGD BIOPSY SINGLE/MULTIPLE: CPT

## 2022-11-08 PROCEDURE — 88305 TISSUE EXAM BY PATHOLOGIST: CPT | Mod: 26

## 2022-11-08 RX ORDER — METFORMIN HYDROCHLORIDE 850 MG/1
1 TABLET ORAL
Qty: 0 | Refills: 0 | DISCHARGE

## 2022-11-08 RX ORDER — LOSARTAN POTASSIUM 100 MG/1
1 TABLET, FILM COATED ORAL
Qty: 0 | Refills: 0 | DISCHARGE

## 2022-11-08 NOTE — ASU PATIENT PROFILE, ADULT - FALL HARM RISK - UNIVERSAL INTERVENTIONS
Bed in lowest position, wheels locked, appropriate side rails in place/Call bell, personal items and telephone in reach/Instruct patient to call for assistance before getting out of bed or chair/Non-slip footwear when patient is out of bed/[place] to call system/Physically safe environment - no spills, clutter or unnecessary equipment/Purposeful Proactive Rounding/Room/bathroom lighting operational, light cord in reach

## 2022-11-08 NOTE — ASU DISCHARGE PLAN (ADULT/PEDIATRIC) - NS MD DC FALL RISK RISK
For information on Fall & Injury Prevention, visit: [email or web address] OR  [email or web address] OR  https://www.cdc.gov/steadi/patient.html

## 2022-11-08 NOTE — H&P PST ADULT - HISTORY OF PRESENT ILLNESS
37 year old M with DM HLD hepatic steatosis admitted for EGD for dyspepsia    RUQ pain bloating for  2 months

## 2022-11-08 NOTE — CHART NOTE - NSCHARTNOTEFT_GEN_A_CORE
PACU ANESTHESIA ADMISSION NOTE      Procedure: EGD with Bx  Post op diagnosis:      ____  Intubated  TV:______       Rate: ______      FiO2: ______    _x___  Patent Airway    _x___  Full return of protective reflexes    ___  Full recovery from anesthesia / back to baseline status    Vitals:            T:     97.4           BP :  146/86              R:   16           Sat:  98%             P: 92      Mental Status:  _x___ Awake   _____ Alert   _____ Drowsy   _____ Sedated    Nausea/Vomiting:  _x___  NO       ______Yes,   See Post - Op Orders         Pain Scale (0-10):  __0___    Treatment: _x___ None    ____ See Post - Op/PCA Orders    Post - Operative Fluids:   __x__ Oral   ____ See Post - Op Orders    Plan: Discharge:   _x___Home       _____Floor     _____Critical Care    _____  Other:_________________    Comments:  No anesthesia issues or complications noted.  Discharge when criteria met.

## 2022-11-09 LAB
COPPER SERPL-MCNC: 115 UG/DL
SURGICAL PATHOLOGY STUDY: SIGNIFICANT CHANGE UP

## 2022-11-10 DIAGNOSIS — R10.13 EPIGASTRIC PAIN: ICD-10-CM

## 2022-11-10 DIAGNOSIS — E11.9 TYPE 2 DIABETES MELLITUS WITHOUT COMPLICATIONS: ICD-10-CM

## 2022-11-10 DIAGNOSIS — Z90.49 ACQUIRED ABSENCE OF OTHER SPECIFIED PARTS OF DIGESTIVE TRACT: ICD-10-CM

## 2022-11-10 DIAGNOSIS — E78.5 HYPERLIPIDEMIA, UNSPECIFIED: ICD-10-CM

## 2022-11-10 DIAGNOSIS — K29.50 UNSPECIFIED CHRONIC GASTRITIS WITHOUT BLEEDING: ICD-10-CM

## 2022-11-10 DIAGNOSIS — Z79.84 LONG TERM (CURRENT) USE OF ORAL HYPOGLYCEMIC DRUGS: ICD-10-CM

## 2022-11-13 ENCOUNTER — OUTPATIENT (OUTPATIENT)
Dept: OUTPATIENT SERVICES | Facility: HOSPITAL | Age: 37
LOS: 1 days | Discharge: HOME | End: 2022-11-13

## 2022-11-13 DIAGNOSIS — Z90.49 ACQUIRED ABSENCE OF OTHER SPECIFIED PARTS OF DIGESTIVE TRACT: Chronic | ICD-10-CM

## 2022-11-13 DIAGNOSIS — R10.9 UNSPECIFIED ABDOMINAL PAIN: ICD-10-CM

## 2022-11-13 DIAGNOSIS — R74.8 ABNORMAL LEVELS OF OTHER SERUM ENZYMES: ICD-10-CM

## 2022-11-13 PROBLEM — I10 ESSENTIAL (PRIMARY) HYPERTENSION: Chronic | Status: ACTIVE | Noted: 2022-11-08

## 2022-11-13 PROBLEM — E11.9 TYPE 2 DIABETES MELLITUS WITHOUT COMPLICATIONS: Chronic | Status: ACTIVE | Noted: 2022-11-08

## 2022-11-13 PROCEDURE — 76700 US EXAM ABDOM COMPLETE: CPT | Mod: 26

## 2022-11-22 LAB
A1AT PHENOTYP SERPL-IMP: NORMAL
A1AT SERPL-MCNC: 158 MG/DL
AST SERPL W P-5'-P-CCNC: 66 IU/L
CHOLEST SERPL-MCNC: 237 MG/DL
COMMENT:: NORMAL
COPPER 24H UR-MCNC: NORMAL
COPPER UR-MCNC: 15 UG/L
COPPER/CREATININE RATIO: 7 UG/G CREAT
CREATININE, URINE: 2.29 G/L
FIBROSIS STAGE SERPL QL: NORMAL
FIBROSURE ALPHA 2 MACROGLOBULINS: 119 MG/DL
FIBROSURE ALT (SGPT): 126 IU/L
FIBROSURE APOLIPOPROTEIN A1: 133 MG/DL
FIBROSURE GGT: 69 IU/L
FIBROSURE HAPTOGLOBIN: 229 MG/DL
FIBROSURE SCORING: NORMAL
FIBROSURE TOTAL BILIRUBIN: 0.4 MG/DL
GLUCOSE SERPL-MCNC: 116 MG/DL
INTERPRETATIONS:: NORMAL
LIVER FIBR SCORE SERPL CALC.FIBROSURE: 0.07
NASH SCORING: NORMAL
NECROINFLAMMATORY ACT GRADE SERPL QL: NORMAL
NECROINFLAMMATORY ACT SCORE SERPL: 0.5
SERVICE CMNT-IMP: NORMAL
STEATOSIS GRADE: NORMAL
STEATOSIS GRADING: NORMAL
STEATOSIS SCORE: 0.83
TRIGL SERPL-MCNC: 137 MG/DL

## 2022-11-23 ENCOUNTER — APPOINTMENT (OUTPATIENT)
Dept: GASTROENTEROLOGY | Facility: CLINIC | Age: 37
End: 2022-11-23

## 2022-11-23 VITALS
HEIGHT: 68 IN | SYSTOLIC BLOOD PRESSURE: 141 MMHG | OXYGEN SATURATION: 98 % | DIASTOLIC BLOOD PRESSURE: 110 MMHG | HEART RATE: 91 BPM | BODY MASS INDEX: 25.76 KG/M2 | WEIGHT: 170 LBS

## 2022-11-23 DIAGNOSIS — R10.13 EPIGASTRIC PAIN: ICD-10-CM

## 2022-11-23 DIAGNOSIS — R10.9 UNSPECIFIED ABDOMINAL PAIN: ICD-10-CM

## 2022-11-23 PROCEDURE — 99215 OFFICE O/P EST HI 40 MIN: CPT

## 2022-11-23 RX ORDER — HEPATITIS B VACCINE (RECOMBINANT) ADJUVANTED 20 UG/.5ML
20 INJECTION, SOLUTION INTRAMUSCULAR
Qty: 1 | Refills: 1 | Status: ACTIVE | COMMUNITY
Start: 2022-11-23 | End: 1900-01-01

## 2022-11-23 RX ORDER — LOSARTAN POTASSIUM 25 MG/1
25 TABLET, FILM COATED ORAL DAILY
Qty: 30 | Refills: 11 | Status: ACTIVE | COMMUNITY
Start: 2022-11-23 | End: 1900-01-01

## 2022-11-25 NOTE — HISTORY OF PRESENT ILLNESS
[___] : Performed [unfilled] [Needlestick Exposure] : no needlestick exposure [Infected Sexual Partner] : no infected sexual partner [IV Drug Use] : no IV drug use [Tattoo] : no tattoos [Body Piercing] : no body piercing [Hemodialysis] : no hemodialysis [Transfusion before 1992] : no transfusion before 1992 [Transplant before 1992] : no transplant before 1992 [Alcohol Abuse] : no alcohol abuse [Cocaine Use] : no cocaine use [de-identified] : 38 yo M of [place] origin seen in follow up \par Patient had reported RUQ pain intermittently for past 2 months. Pain feels like itching inside or pricking pain which lasts few seconds. Occurs several times a day. No increasing or reducing factors. Sometimes pain is in R lumbar area. Has bloating and feels gassy at times but appears related to specific foods. No diarrhea constipation nausea or vomiting. Had cholecystectomy done 2017 for biliary colic / cholecystitis.  Patient had fatty liver for past few years. EGD and US abdomen in 2017.\par Patient had EGD which was normal and with normal pathology. He is eating healthy and lost about 8 lb in  3 weeks and now pain has resolved completely. \par Had elevated liver tests on labs in October 2022.  No use of herbal supplements or teas. \par Diabetic since 2022. On metformin. Maximum wt has been 178 lb. Eats healthy but not been able to exercise. Hba1c was 10 but with carb control and protein diet was able to bring it to 5s. \par Has HLD but not on treatment. Wants to control with diet. \par No prior HTN but BP been high of both visits and at work. Reports starting 25 mg of losartan. 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 Before was drinking only on social occasions - 2 drinks. \par Occasional hookah use\par No drug use\par

## 2022-11-25 NOTE — REVIEW OF SYSTEMS
[Negative] : Psychiatric [Fever] : no fever [Chills] : no chills [Eye Pain] : no eye pain [Red Eyes] : eyes not red [Earache] : no earache [Loss Of Hearing] : no hearing loss [Chest Pain] : no chest pain [Palpitations] : no palpitations [Cough] : no cough [SOB on Exertion] : no shortness of breath during exertion [Joint Swelling] : no joint swelling [Joint Stiffness] : no joint stiffness [Confused] : no confusion [Convulsions] : no convulsions [Proptosis] : no proptosis [Easy Bleeding] : no tendency for easy bleeding [Easy Bruising] : no tendency for easy bruising

## 2022-11-25 NOTE — REASON FOR VISIT
[Follow-Up: _____] : a [unfilled] follow-up visit [Spouse] : spouse [FreeTextEntry1] : F/U - Post EGD - Results  and liver testing

## 2022-11-25 NOTE — ASSESSMENT
[FreeTextEntry1] : 36 yo [place] C[place] M with T2DM hepatic steatosis seen with elevated liver tests and RUQ pain.\par \par Elevated liver tests - PEREZ\par Elevated liver tests in 2018 when admitted for Strep pneumonia. \par Elevated again on labs in Sept 2022 AST 64  \par BMI 27 but has metabolic syndrome with T2DM HLD and HTN\par CLD work - hepatitis A B C negative. ANA MARÍA AMA ASMA Ig negative ACE normal\par HIV negative. Ferritin normal  Iron sat low\par Ceruloplasmin and copper normal. Did random urine copper. Will check 24 hour urine copper\par   HLD 51 Hba1c 6.4 \par Fibroscan showed severe steatosis with CAP score 371 S3 and Fibrosis score F3 12.4 kPA\par Fibrosure showed severe steatosis and no fibrosis\par US abdomen showed echogenic liver \par Has lost wt with healthy eating and exercise.\par Discussed liver biopsy as ALT > 4 x ULN and advanced fibrosis on fibroscan\par Patient is losing wt so will repeat liver tests in 3 months and decide on liver biopsy\par \par RUQ pain - now resolved\par Reports normal EGD in 2017. Repeat EGD and pathology negative for H pylori and duodenal biopsy normal \par Dyspeptic symptoms have now resolved with eating healthy and time changes with meals\par \par HTN\par Taking losartan prescribed by pharmacist\par Ordered losartan 25 mg daily and record BP readings at home with PCP follow up.\par \par HLD\par Has high TG and LDL with normal HDL\par Wants to control with diet and exercise. DM controlled now.\par Can use Omega 3 if desires\par \par Health maintenance\par Hepatitis A and B not immune. Advised vaccination\par HCV screening negative\par \par

## 2022-11-25 NOTE — PHYSICAL EXAM
[General Appearance - Alert] : alert [General Appearance - Well Nourished] : well nourished [General Appearance - Well Developed] : well developed [Sclera] : the sclera and conjunctiva were normal [Outer Ear] : the ears and nose were normal in appearance [Hearing Threshold Finger Rub Not [place]] : hearing was normal [Neck Appearance] : the appearance of the neck was normal [Neck Cervical Mass (___cm)] : no neck mass was observed [Jugular Venous Distention Increased] : there was no jugular-venous distention [Thyroid Diffuse Enlargement] : the thyroid was not enlarged [Respiration, Rhythm And Depth] : normal respiratory rhythm and effort [Auscultation Breath Sounds / Voice Sounds] : lungs were clear to auscultation bilaterally [Heart Sounds] : normal S1 and S2 [Murmurs] : no murmurs [Abdomen Soft] : soft [Abdomen Tenderness] : non-tender [Nail Clubbing] : no clubbing  or cyanosis of the fingernails [Skin Color & Pigmentation] : normal skin color and pigmentation [] : no rash [No Focal Deficits] : no focal deficits [Oriented To Time, Place, And Person] : oriented to person, place, and time [Abnormal Walk] : normal gait [Musculoskeletal - Swelling] : no joint swelling seen

## 2023-02-15 ENCOUNTER — APPOINTMENT (OUTPATIENT)
Dept: GASTROENTEROLOGY | Facility: CLINIC | Age: 38
End: 2023-02-15
Payer: MEDICAID

## 2023-02-15 VITALS
HEART RATE: 98 BPM | HEIGHT: 68 IN | OXYGEN SATURATION: 98 % | SYSTOLIC BLOOD PRESSURE: 125 MMHG | WEIGHT: 162 LBS | BODY MASS INDEX: 24.55 KG/M2 | DIASTOLIC BLOOD PRESSURE: 89 MMHG

## 2023-02-15 DIAGNOSIS — R74.8 ABNORMAL LEVELS OF OTHER SERUM ENZYMES: ICD-10-CM

## 2023-02-15 DIAGNOSIS — K75.81 NONALCOHOLIC STEATOHEPATITIS (NASH): ICD-10-CM

## 2023-02-15 DIAGNOSIS — E78.5 HYPERLIPIDEMIA, UNSPECIFIED: ICD-10-CM

## 2023-02-15 DIAGNOSIS — E11.9 TYPE 2 DIABETES MELLITUS W/OUT COMPLICATIONS: ICD-10-CM

## 2023-02-15 DIAGNOSIS — Z00.00 ENCOUNTER FOR GENERAL ADULT MEDICAL EXAMINATION W/OUT ABNORMAL FINDINGS: ICD-10-CM

## 2023-02-15 PROCEDURE — 99214 OFFICE O/P EST MOD 30 MIN: CPT

## 2023-02-15 NOTE — HISTORY OF PRESENT ILLNESS
[___] : Performed [unfilled] [Needlestick Exposure] : no needlestick exposure [Infected Sexual Partner] : no infected sexual partner [IV Drug Use] : no IV drug use [Tattoo] : no tattoos [Body Piercing] : no body piercing [Hemodialysis] : no hemodialysis [Transfusion before 1992] : no transfusion before 1992 [Transplant before 1992] : no transplant before 1992 [Alcohol Abuse] : no alcohol abuse [Cocaine Use] : no cocaine use [de-identified] : 38 yo M of [place] origin seen in follow up \par Patient had reported RUQ pain intermittently for past 2 months. Pain feels like itching inside or pricking pain which lasts few seconds. Occurs several times a day. No increasing or reducing factors. Sometimes pain is in R lumbar area. Has bloating and feels gassy at times but appears related to specific foods. No diarrhea constipation nausea or vomiting. Had cholecystectomy done 2017 for biliary colic / cholecystitis.  Patient had fatty liver for past few years. EGD and US abdomen in 2017.\par Patient had EGD which was normal and with normal pathology. He is eating healthy and lost about 8 lb in  3 weeks and now pain has resolved completely. \par Had elevated liver tests on labs in October 2022.  No use of herbal supplements or teas. \par Diabetic since 2022. On metformin. Maximum wt has been 178 lb. Eats healthy but not been able to exercise. Hba1c was 10 but with carb control and protein diet was able to bring it to 5s. \par Has HLD but not on treatment. Wants to control with diet. \par No prior HTN but BP been high of both visits and at work. Reports starting 25 mg of losartan. \par \par Doing well. \par No complaints. Lost 16 lb in total. \par Abdominal pain resolved.  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 Before was drinking only on social occasions - 2 drinks. \par Occasional hookah use\par No drug use\par

## 2023-02-15 NOTE — PHYSICAL EXAM
[General Appearance - Alert] : alert [General Appearance - Well Nourished] : well nourished [General Appearance - Well Developed] : well developed [Outer Ear] : the ears and nose were normal in appearance [Sclera] : the sclera and conjunctiva were normal [Hearing Threshold Finger Rub Not [place]] : hearing was normal [Respiration, Rhythm And Depth] : normal respiratory rhythm and effort [Auscultation Breath Sounds / Voice Sounds] : lungs were clear to auscultation bilaterally [Heart Sounds] : normal S1 and S2 [Murmurs] : no murmurs [Abdomen Soft] : soft [Abdomen Tenderness] : non-tender [] : no hepato-splenomegaly [Abnormal Walk] : normal gait [Nail Clubbing] : no clubbing  or cyanosis of the fingernails [Musculoskeletal - Swelling] : no joint swelling seen [Skin Color & Pigmentation] : normal skin color and pigmentation [No Focal Deficits] : no focal deficits [Oriented To Time, Place, And Person] : oriented to person, place, and time [Scleral Icterus] : No Scleral Icterus [FreeTextEntry1] : RUQ scar+

## 2023-02-15 NOTE — ASSESSMENT
[FreeTextEntry1] : 36 yo Palestinian M with T2DM hepatic steatosis seen with elevated liver tests and RUQ pain.\par \par Elevated liver tests - PEREZ\par Elevated liver tests in 2018 when admitted for Strep pneumonia. \par Sept 2022 AST 64  \par Hepatitis A B C HIV negative. Ferritin Iron sat \par   HLD 51 Hba1c 6.4 \par BMI 27 but has metabolic syndrome with T2DM HLD and HTN\par CLD work - hepatitis A B C negative. ANA MARÍA AMA ASMA Ig negative ACE normal\par Ceruloplasmin and copper normal. Did normal random urine copper. \par  24 hour urine copper if liver enzymes elevated again\par ALT down to 33 AST 21  A1c 5.6\par Fibroscan showed severe steatosis with CAP score 371 S3 and Fibrosis score F3 12.4 kPA\par Fibrosure showed severe steatosis and no fibrosis\par US abdomen showed echogenic liver \par Discussed liver biopsy as ALT > 4 x ULN and advanced fibrosis on fibroscan but liver enzymes are now normal\par and patient wants to hold off.\par Has lost wt  - 16 lb with healthy eating. Not able to exercise and advised to exercise as well\par Will repeat fibroscan in 1 year and and decide on liver biopsy\par \par RUQ pain - resolved\par Reports normal EGD in 2017. Repeat EGD and pathology negative for H pylori and duodenal biopsy normal \par Dyspeptic symptoms have now resolved with eating healthy and time changes with meals\par \par T2DM\par A1c now 5.6\par \par HTN\par BP mild elevation. Pt is on losartan. Follow up with PCP.\par \par HLD\par Elevated   TG - 306. On omega 3. \par Exercise and lose few more lbs\par Does not want to start treatment but if still elevated advised to start\par \par Low iron\par Has low iron and Fe sat. Advised green leafy vegetables and lean meats.\par With elevated LDL would limit red meats\par Can start iron supplementation\par \par Health maintenance\par Hepatitis A started vaccination and has immunity\par Hepatitis B - just completed Heplisav B and low immunity. Maybe early and will repeat HbsAb titer\par \par \par Follow up in 1 year

## 2023-09-08 NOTE — ED ADULT TRIAGE NOTE - STATUS:
Intact Detail Level: Detailed Depth Of Biopsy: dermis Was A Bandage Applied: Yes Size Of Lesion In Cm: 0 Biopsy Type: H and E Biopsy Method: Dermablade Anesthesia Type: 1% lidocaine with epinephrine Anesthesia Volume In Cc: 0.5 Hemostasis: Drysol Wound Care: Petrolatum Dressing: bandage Destruction After The Procedure: No Type Of Destruction Used: Curettage Curettage Text: The wound bed was treated with curettage after the biopsy was performed. Cryotherapy Text: The wound bed was treated with cryotherapy after the biopsy was performed. Electrodesiccation Text: The wound bed was treated with electrodesiccation after the biopsy was performed. Electrodesiccation And Curettage Text: The wound bed was treated with electrodesiccation and curettage after the biopsy was performed. Silver Nitrate Text: The wound bed was treated with silver nitrate after the biopsy was performed. Lab: [id number] Lab Facility: [id number] Consent: Written consent was obtained and risks were reviewed including but not limited to scarring, infection, bleeding, scabbing, incomplete removal, nerve damage and allergy to anesthesia. Post-Care Instructions: I reviewed with the patient in detail post-care instructions. Patient is to keep the biopsy site dry overnight, and then apply bacitracin twice daily until healed. Patient may apply hydrogen peroxide soaks to remove any crusting. Notification Instructions: Patient will be notified of biopsy results. However, patient instructed to call the office if not contacted within 2 weeks. Billing Type: Third-Party Bill Information: Selecting Yes will display possible errors in your note based on the variables you have selected. This validation is only offered as a suggestion for you. PLEASE NOTE THAT THE VALIDATION TEXT WILL BE REMOVED WHEN YOU FINALIZE YOUR NOTE. IF YOU WANT TO FAX A PRELIMINARY NOTE YOU WILL NEED TO TOGGLE THIS TO 'NO' IF YOU DO NOT WANT IT IN YOUR FAXED NOTE.

## 2024-09-02 NOTE — ED PROVIDER NOTE - NS ED MD TWO NIGHTS YN
Likely worsened with acute infection.  Ambulates with walker at baseline.  Fall precaution, PT OT   Yes

## 2025-07-28 NOTE — ASU PREOP CHECKLIST - WEIGHT IN LBS
Follow up with  in 1 year     Hold Toprol XL for 2 weeks ,and let us know if fatigue improves.   Please get labs .    Keep appointment for ECHO   
177.9